# Patient Record
Sex: MALE | Race: WHITE | NOT HISPANIC OR LATINO | Employment: UNEMPLOYED | URBAN - METROPOLITAN AREA
[De-identification: names, ages, dates, MRNs, and addresses within clinical notes are randomized per-mention and may not be internally consistent; named-entity substitution may affect disease eponyms.]

---

## 2019-12-05 ENCOUNTER — OFFICE VISIT (OUTPATIENT)
Dept: OCCUPATIONAL MEDICINE | Age: 29
End: 2019-12-05

## 2019-12-05 DIAGNOSIS — Z02.1 DRUG TESTING, PRE-EMPLOYMENT: Primary | ICD-10-CM

## 2019-12-05 PROCEDURE — OH093 7 PANEL RAPID DRUG SCREEN: Performed by: PREVENTIVE MEDICINE

## 2019-12-06 ENCOUNTER — APPOINTMENT (OUTPATIENT)
Dept: OCCUPATIONAL MEDICINE | Age: 29
End: 2019-12-06

## 2019-12-23 ENCOUNTER — OCC HEALTH (OUTPATIENT)
Dept: OCCUPATIONAL MEDICINE | Age: 29
End: 2019-12-23

## 2019-12-23 VITALS
BODY MASS INDEX: 28.63 KG/M2 | WEIGHT: 216 LBS | DIASTOLIC BLOOD PRESSURE: 78 MMHG | SYSTOLIC BLOOD PRESSURE: 116 MMHG | HEART RATE: 60 BPM | HEIGHT: 73 IN

## 2019-12-23 DIAGNOSIS — Z02.1 PRE-EMPLOYMENT HEALTH SCREENING EXAMINATION: Primary | ICD-10-CM

## 2019-12-23 DIAGNOSIS — Z02.89 ENCOUNTER FOR OCCUPATIONAL HEALTH EXAMINATION: ICD-10-CM

## 2019-12-23 DIAGNOSIS — Z02.89 VISIT FOR OCCUPATIONAL HEALTH EXAMINATION: ICD-10-CM

## 2019-12-23 PROCEDURE — 86480 TB TEST CELL IMMUN MEASURE: CPT | Performed by: INTERNAL MEDICINE

## 2019-12-23 PROCEDURE — 36415 COLL VENOUS BLD VENIPUNCTURE: CPT | Performed by: PHYSICIAN ASSISTANT

## 2019-12-23 PROCEDURE — OH093 7 PANEL RAPID DRUG SCREEN: Performed by: PHYSICIAN ASSISTANT

## 2019-12-23 PROCEDURE — OH021 PRE PLACEMENT PHYSICAL EXAM: Performed by: PHYSICIAN ASSISTANT

## 2019-12-25 LAB
GAMMA INTERFERON BACKGROUND BLD IA-ACNC: 0.03 IU/ML
M TB IFN-G BLD-IMP: NEGATIVE
M TB IFN-G CD4+ BCKGRND COR BLD-ACNC: 0 IU/ML
M TB IFN-G CD4+CD8+ BCKGRND COR BLD-ACNC: 0 IU/ML
MITOGEN IGNF BCKGRD COR BLD-ACNC: >10 IU/ML

## 2020-06-02 ENCOUNTER — APPOINTMENT (OUTPATIENT)
Dept: OCCUPATIONAL MEDICINE | Age: 30
End: 2020-06-02

## 2022-02-28 ENCOUNTER — APPOINTMENT (OUTPATIENT)
Dept: CT IMAGING | Facility: HOSPITAL | Age: 32
End: 2022-02-28

## 2022-02-28 ENCOUNTER — HOSPITAL ENCOUNTER (EMERGENCY)
Facility: HOSPITAL | Age: 32
Discharge: LEFT BEFORE DISCHARGE | End: 2022-02-28
Admitting: EMERGENCY MEDICINE

## 2022-02-28 VITALS
HEART RATE: 118 BPM | TEMPERATURE: 97.7 F | BODY MASS INDEX: 24.87 KG/M2 | DIASTOLIC BLOOD PRESSURE: 87 MMHG | SYSTOLIC BLOOD PRESSURE: 142 MMHG | HEIGHT: 75 IN | RESPIRATION RATE: 18 BRPM | OXYGEN SATURATION: 96 % | WEIGHT: 200 LBS

## 2022-02-28 DIAGNOSIS — R19.7 DIARRHEA, UNSPECIFIED TYPE: Primary | ICD-10-CM

## 2022-02-28 LAB
ALBUMIN SERPL-MCNC: 4.47 G/DL (ref 3.5–5.2)
ALBUMIN/GLOB SERPL: 1.5 G/DL
ALP SERPL-CCNC: 124 U/L (ref 39–117)
ALT SERPL W P-5'-P-CCNC: 34 U/L (ref 1–41)
ANION GAP SERPL CALCULATED.3IONS-SCNC: 12.5 MMOL/L (ref 5–15)
AST SERPL-CCNC: 29 U/L (ref 1–40)
BASOPHILS # BLD AUTO: 0.05 10*3/MM3 (ref 0–0.2)
BASOPHILS NFR BLD AUTO: 0.5 % (ref 0–1.5)
BILIRUB SERPL-MCNC: 0.3 MG/DL (ref 0–1.2)
BILIRUB UR QL STRIP: NEGATIVE
BUN SERPL-MCNC: 12 MG/DL (ref 6–20)
BUN/CREAT SERPL: 14.1 (ref 7–25)
CALCIUM SPEC-SCNC: 9.5 MG/DL (ref 8.6–10.5)
CHLORIDE SERPL-SCNC: 101 MMOL/L (ref 98–107)
CLARITY UR: CLEAR
CO2 SERPL-SCNC: 23.5 MMOL/L (ref 22–29)
COLOR UR: ABNORMAL
CREAT SERPL-MCNC: 0.85 MG/DL (ref 0.76–1.27)
DEPRECATED RDW RBC AUTO: 49.8 FL (ref 37–54)
EGFRCR SERPLBLD CKD-EPI 2021: 119.9 ML/MIN/1.73
EOSINOPHIL # BLD AUTO: 0.14 10*3/MM3 (ref 0–0.4)
EOSINOPHIL NFR BLD AUTO: 1.4 % (ref 0.3–6.2)
ERYTHROCYTE [DISTWIDTH] IN BLOOD BY AUTOMATED COUNT: 15.5 % (ref 12.3–15.4)
GLOBULIN UR ELPH-MCNC: 2.9 GM/DL
GLUCOSE SERPL-MCNC: 91 MG/DL (ref 65–99)
GLUCOSE UR STRIP-MCNC: NEGATIVE MG/DL
HCT VFR BLD AUTO: 46.4 % (ref 37.5–51)
HGB BLD-MCNC: 15.1 G/DL (ref 13–17.7)
HGB UR QL STRIP.AUTO: NEGATIVE
HOLD SPECIMEN: NORMAL
HOLD SPECIMEN: NORMAL
IMM GRANULOCYTES # BLD AUTO: 0.03 10*3/MM3 (ref 0–0.05)
IMM GRANULOCYTES NFR BLD AUTO: 0.3 % (ref 0–0.5)
KETONES UR QL STRIP: NEGATIVE
LEUKOCYTE ESTERASE UR QL STRIP.AUTO: NEGATIVE
LIPASE SERPL-CCNC: 25 U/L (ref 13–60)
LYMPHOCYTES # BLD AUTO: 2.93 10*3/MM3 (ref 0.7–3.1)
LYMPHOCYTES NFR BLD AUTO: 29.4 % (ref 19.6–45.3)
MCH RBC QN AUTO: 28.6 PG (ref 26.6–33)
MCHC RBC AUTO-ENTMCNC: 32.5 G/DL (ref 31.5–35.7)
MCV RBC AUTO: 87.9 FL (ref 79–97)
MONOCYTES # BLD AUTO: 0.66 10*3/MM3 (ref 0.1–0.9)
MONOCYTES NFR BLD AUTO: 6.6 % (ref 5–12)
NEUTROPHILS NFR BLD AUTO: 6.14 10*3/MM3 (ref 1.7–7)
NEUTROPHILS NFR BLD AUTO: 61.8 % (ref 42.7–76)
NITRITE UR QL STRIP: NEGATIVE
NRBC BLD AUTO-RTO: 0 /100 WBC (ref 0–0.2)
PH UR STRIP.AUTO: 6.5 [PH] (ref 5–8)
PLATELET # BLD AUTO: 256 10*3/MM3 (ref 140–450)
PMV BLD AUTO: 11.4 FL (ref 6–12)
POTASSIUM SERPL-SCNC: 4.1 MMOL/L (ref 3.5–5.2)
PROT SERPL-MCNC: 7.4 G/DL (ref 6–8.5)
PROT UR QL STRIP: NEGATIVE
RBC # BLD AUTO: 5.28 10*6/MM3 (ref 4.14–5.8)
SODIUM SERPL-SCNC: 137 MMOL/L (ref 136–145)
SP GR UR STRIP: >=1.03 (ref 1–1.03)
UROBILINOGEN UR QL STRIP: ABNORMAL
WBC NRBC COR # BLD: 9.95 10*3/MM3 (ref 3.4–10.8)
WHOLE BLOOD HOLD SPECIMEN: NORMAL
WHOLE BLOOD HOLD SPECIMEN: NORMAL

## 2022-02-28 PROCEDURE — 83690 ASSAY OF LIPASE: CPT | Performed by: PHYSICIAN ASSISTANT

## 2022-02-28 PROCEDURE — 74176 CT ABD & PELVIS W/O CONTRAST: CPT

## 2022-02-28 PROCEDURE — 36415 COLL VENOUS BLD VENIPUNCTURE: CPT

## 2022-02-28 PROCEDURE — 81003 URINALYSIS AUTO W/O SCOPE: CPT | Performed by: PHYSICIAN ASSISTANT

## 2022-02-28 PROCEDURE — 99282 EMERGENCY DEPT VISIT SF MDM: CPT

## 2022-02-28 PROCEDURE — 80053 COMPREHEN METABOLIC PANEL: CPT | Performed by: PHYSICIAN ASSISTANT

## 2022-02-28 PROCEDURE — 85025 COMPLETE CBC W/AUTO DIFF WBC: CPT | Performed by: PHYSICIAN ASSISTANT

## 2022-03-01 NOTE — ED NOTES
Pt approached triage desk stating he wishes to leave and doesn't want to wait for further testing. Educated pt on risk and benefits of leaving AMA, verbalized understanding, continues to wish to leave AMA.     Alma London, RN  02/28/22 [id number]

## 2022-03-01 NOTE — DISCHARGE INSTRUCTIONS
Call one of the offices below to establish a primary care provider.  If you are unable to get an appointment and feel it is an emergency and need to be seen immediately please return to the Emergency Department.    Call one of the office below to set up a primary care provider.    Dr. Jeramie Avalos                                                                                                       [address] [facility] [address]  [phone]    Dr. Paredes, Dr. DEBBI Yoon, Dr. RAQUEL Yoon ([facility])  [address] [facility] [address]  [phone]    Dr. Haywood, Dr. Emery, Dr. Andersen ([facility])  [address] [facility] [address]  [phone]    Dr. Zacarias  [address] [facility] [address]  [phone]    Dr. Torres, Dr. Hung, Dr. Bee, Dr. Dan ([facility])  [address] [place] [address] [address]  [facility] [address]  [phone]    Dr. Carol Mak  [address] [facility] [place] [address]  [phone]    Dr. Alayna Francisco  [address]   [address]  [facility] [address]  [phone]    Dr. Avalos  [address] [facility] [address]  [phone]    Dr. Gomez, Dr. Dave  [address] [facility] [place] [address]  [phone]    Dr. Salazar  [address]                                                              [address]  [facility] [address]  [phone]    Dr. Gupta  [address] [facility] [address]  [phone]    Dr. Jocelynn Reilly  [address]  [address]  [place] [address]  [phone]    Dr. Flowers and [facility]   [address] [facility]  [address]  [place] [address]  [phone]

## 2023-11-14 ENCOUNTER — HOSPITAL ENCOUNTER (EMERGENCY)
Facility: HOSPITAL | Age: 33
Discharge: PSYCHIATRIC OR SUBSTANCE USE FACILITY | DRG: 897 | End: 2023-11-15
Attending: EMERGENCY MEDICINE | Admitting: EMERGENCY MEDICINE
Payer: MEDICAID

## 2023-11-14 DIAGNOSIS — R45.851 SUICIDAL IDEATIONS: Primary | ICD-10-CM

## 2023-11-14 DIAGNOSIS — F28 OTHER PSYCHOTIC DISORDER NOT DUE TO SUBSTANCE OR KNOWN PHYSIOLOGICAL CONDITION: ICD-10-CM

## 2023-11-14 LAB
ALBUMIN SERPL-MCNC: 4.9 G/DL (ref 3.5–5.2)
ALBUMIN/GLOB SERPL: 1.4 G/DL
ALP SERPL-CCNC: 121 U/L (ref 39–117)
ALT SERPL W P-5'-P-CCNC: 26 U/L (ref 1–41)
AMPHET+METHAMPHET UR QL: POSITIVE
AMPHETAMINES UR QL: POSITIVE
ANION GAP SERPL CALCULATED.3IONS-SCNC: 14.6 MMOL/L (ref 5–15)
AST SERPL-CCNC: 17 U/L (ref 1–40)
BACTERIA UR QL AUTO: ABNORMAL /HPF
BARBITURATES UR QL SCN: NEGATIVE
BASOPHILS # BLD AUTO: 0.05 10*3/MM3 (ref 0–0.2)
BASOPHILS NFR BLD AUTO: 0.4 % (ref 0–1.5)
BENZODIAZ UR QL SCN: NEGATIVE
BILIRUB SERPL-MCNC: 0.7 MG/DL (ref 0–1.2)
BILIRUB UR QL STRIP: ABNORMAL
BUN SERPL-MCNC: 21 MG/DL (ref 6–20)
BUN/CREAT SERPL: 16.3 (ref 7–25)
BUPRENORPHINE SERPL-MCNC: NEGATIVE NG/ML
CALCIUM SPEC-SCNC: 10.6 MG/DL (ref 8.6–10.5)
CANNABINOIDS SERPL QL: POSITIVE
CHLORIDE SERPL-SCNC: 105 MMOL/L (ref 98–107)
CLARITY UR: ABNORMAL
CO2 SERPL-SCNC: 19.4 MMOL/L (ref 22–29)
COCAINE UR QL: NEGATIVE
COLOR UR: ABNORMAL
CREAT SERPL-MCNC: 1.29 MG/DL (ref 0.76–1.27)
DEPRECATED RDW RBC AUTO: 44.4 FL (ref 37–54)
EGFRCR SERPLBLD CKD-EPI 2021: 75.6 ML/MIN/1.73
EOSINOPHIL # BLD AUTO: 0.01 10*3/MM3 (ref 0–0.4)
EOSINOPHIL NFR BLD AUTO: 0.1 % (ref 0.3–6.2)
ERYTHROCYTE [DISTWIDTH] IN BLOOD BY AUTOMATED COUNT: 13.8 % (ref 12.3–15.4)
ETHANOL BLD-MCNC: <10 MG/DL (ref 0–10)
ETHANOL UR QL: <0.01 %
FENTANYL UR-MCNC: NEGATIVE NG/ML
GLOBULIN UR ELPH-MCNC: 3.5 GM/DL
GLUCOSE SERPL-MCNC: 134 MG/DL (ref 65–99)
GLUCOSE UR STRIP-MCNC: NEGATIVE MG/DL
HCT VFR BLD AUTO: 49.3 % (ref 37.5–51)
HGB BLD-MCNC: 16.5 G/DL (ref 13–17.7)
HGB UR QL STRIP.AUTO: NEGATIVE
HYALINE CASTS UR QL AUTO: ABNORMAL /LPF
IMM GRANULOCYTES # BLD AUTO: 0.04 10*3/MM3 (ref 0–0.05)
IMM GRANULOCYTES NFR BLD AUTO: 0.3 % (ref 0–0.5)
KETONES UR QL STRIP: ABNORMAL
LEUKOCYTE ESTERASE UR QL STRIP.AUTO: NEGATIVE
LYMPHOCYTES # BLD AUTO: 2.07 10*3/MM3 (ref 0.7–3.1)
LYMPHOCYTES NFR BLD AUTO: 16.4 % (ref 19.6–45.3)
MAGNESIUM SERPL-MCNC: 1.9 MG/DL (ref 1.6–2.6)
MCH RBC QN AUTO: 29.2 PG (ref 26.6–33)
MCHC RBC AUTO-ENTMCNC: 33.5 G/DL (ref 31.5–35.7)
MCV RBC AUTO: 87.1 FL (ref 79–97)
METHADONE UR QL SCN: NEGATIVE
MONOCYTES # BLD AUTO: 0.87 10*3/MM3 (ref 0.1–0.9)
MONOCYTES NFR BLD AUTO: 6.9 % (ref 5–12)
NEUTROPHILS NFR BLD AUTO: 75.9 % (ref 42.7–76)
NEUTROPHILS NFR BLD AUTO: 9.59 10*3/MM3 (ref 1.7–7)
NITRITE UR QL STRIP: NEGATIVE
NRBC BLD AUTO-RTO: 0 /100 WBC (ref 0–0.2)
OPIATES UR QL: NEGATIVE
OXYCODONE UR QL SCN: NEGATIVE
PCP UR QL SCN: NEGATIVE
PH UR STRIP.AUTO: 6.5 [PH] (ref 5–8)
PLATELET # BLD AUTO: 323 10*3/MM3 (ref 140–450)
PMV BLD AUTO: 11.2 FL (ref 6–12)
POTASSIUM SERPL-SCNC: 3.4 MMOL/L (ref 3.5–5.2)
PROT SERPL-MCNC: 8.4 G/DL (ref 6–8.5)
PROT UR QL STRIP: ABNORMAL
RBC # BLD AUTO: 5.66 10*6/MM3 (ref 4.14–5.8)
RBC # UR STRIP: ABNORMAL /HPF
REF LAB TEST METHOD: ABNORMAL
SODIUM SERPL-SCNC: 139 MMOL/L (ref 136–145)
SP GR UR STRIP: >1.03 (ref 1–1.03)
SQUAMOUS #/AREA URNS HPF: ABNORMAL /HPF
TRICYCLICS UR QL SCN: NEGATIVE
UROBILINOGEN UR QL STRIP: ABNORMAL
WBC # UR STRIP: ABNORMAL /HPF
WBC NRBC COR # BLD: 12.63 10*3/MM3 (ref 3.4–10.8)

## 2023-11-14 PROCEDURE — 80053 COMPREHEN METABOLIC PANEL: CPT | Performed by: EMERGENCY MEDICINE

## 2023-11-14 PROCEDURE — 99285 EMERGENCY DEPT VISIT HI MDM: CPT

## 2023-11-14 PROCEDURE — 81001 URINALYSIS AUTO W/SCOPE: CPT | Performed by: EMERGENCY MEDICINE

## 2023-11-14 PROCEDURE — 25010000002 HALOPERIDOL LACTATE PER 5 MG: Performed by: PHYSICIAN ASSISTANT

## 2023-11-14 PROCEDURE — 85025 COMPLETE CBC W/AUTO DIFF WBC: CPT | Performed by: EMERGENCY MEDICINE

## 2023-11-14 PROCEDURE — 96372 THER/PROPH/DIAG INJ SC/IM: CPT

## 2023-11-14 PROCEDURE — 83735 ASSAY OF MAGNESIUM: CPT | Performed by: EMERGENCY MEDICINE

## 2023-11-14 PROCEDURE — 80307 DRUG TEST PRSMV CHEM ANLYZR: CPT | Performed by: EMERGENCY MEDICINE

## 2023-11-14 PROCEDURE — 82077 ASSAY SPEC XCP UR&BREATH IA: CPT | Performed by: EMERGENCY MEDICINE

## 2023-11-14 RX ORDER — HALOPERIDOL 5 MG/ML
10 INJECTION INTRAMUSCULAR ONCE
Status: COMPLETED | OUTPATIENT
Start: 2023-11-15 | End: 2023-11-14

## 2023-11-14 RX ORDER — LORAZEPAM 1 MG/1
1 TABLET ORAL ONCE
Status: COMPLETED | OUTPATIENT
Start: 2023-11-14 | End: 2023-11-14

## 2023-11-14 RX ADMIN — LORAZEPAM 1 MG: 1 TABLET ORAL at 22:52

## 2023-11-14 RX ADMIN — HALOPERIDOL LACTATE 10 MG: 5 INJECTION, SOLUTION INTRAMUSCULAR at 23:47

## 2023-11-15 ENCOUNTER — HOSPITAL ENCOUNTER (INPATIENT)
Facility: HOSPITAL | Age: 33
LOS: 7 days | Discharge: HOME | DRG: 897 | End: 2023-11-22
Attending: PSYCHIATRY & NEUROLOGY | Admitting: PSYCHIATRY & NEUROLOGY
Payer: MEDICAID

## 2023-11-15 VITALS
WEIGHT: 211 LBS | HEIGHT: 75 IN | SYSTOLIC BLOOD PRESSURE: 97 MMHG | DIASTOLIC BLOOD PRESSURE: 64 MMHG | HEART RATE: 85 BPM | BODY MASS INDEX: 26.24 KG/M2 | TEMPERATURE: 98 F | OXYGEN SATURATION: 97 % | RESPIRATION RATE: 16 BRPM

## 2023-11-15 PROBLEM — R45.851 SUICIDAL IDEATION: Status: ACTIVE | Noted: 2023-11-15

## 2023-11-15 PROBLEM — F29 PSYCHOSIS: Status: ACTIVE | Noted: 2023-11-15

## 2023-11-15 PROBLEM — F19.90 POLYSUBSTANCE USE DISORDER: Status: ACTIVE | Noted: 2023-11-15

## 2023-11-15 LAB
HAV IGM SERPL QL IA: ABNORMAL
HBV CORE IGM SERPL QL IA: ABNORMAL
HBV SURFACE AG SERPL QL IA: ABNORMAL
HCV AB SER DONR QL: REACTIVE

## 2023-11-15 PROCEDURE — 99223 1ST HOSP IP/OBS HIGH 75: CPT | Performed by: PSYCHIATRY & NEUROLOGY

## 2023-11-15 PROCEDURE — 80074 ACUTE HEPATITIS PANEL: CPT | Performed by: PSYCHIATRY & NEUROLOGY

## 2023-11-15 PROCEDURE — 93005 ELECTROCARDIOGRAM TRACING: CPT | Performed by: PSYCHIATRY & NEUROLOGY

## 2023-11-15 RX ORDER — AMOXICILLIN AND CLAVULANATE POTASSIUM 875; 125 MG/1; MG/1
1 TABLET, FILM COATED ORAL 2 TIMES DAILY
Status: COMPLETED | OUTPATIENT
Start: 2023-11-15 | End: 2023-11-18

## 2023-11-15 RX ORDER — OLANZAPINE 5 MG/1
5 TABLET ORAL NIGHTLY
Status: DISCONTINUED | OUTPATIENT
Start: 2023-11-15 | End: 2023-11-21

## 2023-11-15 RX ORDER — ECHINACEA PURPUREA EXTRACT 125 MG
2 TABLET ORAL AS NEEDED
Status: DISCONTINUED | OUTPATIENT
Start: 2023-11-15 | End: 2023-11-22 | Stop reason: HOSPADM

## 2023-11-15 RX ORDER — FAMOTIDINE 20 MG/1
20 TABLET, FILM COATED ORAL 2 TIMES DAILY PRN
Status: DISCONTINUED | OUTPATIENT
Start: 2023-11-15 | End: 2023-11-22 | Stop reason: HOSPADM

## 2023-11-15 RX ORDER — BENZONATATE 100 MG/1
100 CAPSULE ORAL 3 TIMES DAILY PRN
Status: DISCONTINUED | OUTPATIENT
Start: 2023-11-15 | End: 2023-11-22 | Stop reason: HOSPADM

## 2023-11-15 RX ORDER — ALBUTEROL SULFATE 90 UG/1
2 AEROSOL, METERED RESPIRATORY (INHALATION) EVERY 4 HOURS PRN
COMMUNITY

## 2023-11-15 RX ORDER — ACETAMINOPHEN 325 MG/1
650 TABLET ORAL EVERY 6 HOURS PRN
Status: DISCONTINUED | OUTPATIENT
Start: 2023-11-15 | End: 2023-11-22 | Stop reason: HOSPADM

## 2023-11-15 RX ORDER — ROPINIROLE 1 MG/1
2 TABLET, FILM COATED ORAL NIGHTLY
Status: DISCONTINUED | OUTPATIENT
Start: 2023-11-15 | End: 2023-11-22 | Stop reason: HOSPADM

## 2023-11-15 RX ORDER — NICOTINE 21 MG/24HR
1 PATCH, TRANSDERMAL 24 HOURS TRANSDERMAL
Status: DISCONTINUED | OUTPATIENT
Start: 2023-11-15 | End: 2023-11-22 | Stop reason: HOSPADM

## 2023-11-15 RX ORDER — AMOXICILLIN 875 MG/1
875 TABLET, COATED ORAL 2 TIMES DAILY
COMMUNITY
Start: 2023-11-09 | End: 2023-11-22 | Stop reason: HOSPADM

## 2023-11-15 RX ORDER — ALBUTEROL SULFATE 90 UG/1
2 AEROSOL, METERED RESPIRATORY (INHALATION) EVERY 4 HOURS PRN
Status: DISCONTINUED | OUTPATIENT
Start: 2023-11-15 | End: 2023-11-22 | Stop reason: HOSPADM

## 2023-11-15 RX ORDER — ALUMINA, MAGNESIA, AND SIMETHICONE 2400; 2400; 240 MG/30ML; MG/30ML; MG/30ML
15 SUSPENSION ORAL EVERY 6 HOURS PRN
Status: DISCONTINUED | OUTPATIENT
Start: 2023-11-15 | End: 2023-11-22 | Stop reason: HOSPADM

## 2023-11-15 RX ORDER — DICYCLOMINE HYDROCHLORIDE 10 MG/1
10 CAPSULE ORAL DAILY PRN
COMMUNITY

## 2023-11-15 RX ORDER — TRAZODONE HYDROCHLORIDE 50 MG/1
50 TABLET ORAL NIGHTLY PRN
Status: DISCONTINUED | OUTPATIENT
Start: 2023-11-15 | End: 2023-11-22 | Stop reason: HOSPADM

## 2023-11-15 RX ORDER — LOPERAMIDE HYDROCHLORIDE 2 MG/1
2 CAPSULE ORAL
Status: DISCONTINUED | OUTPATIENT
Start: 2023-11-15 | End: 2023-11-22 | Stop reason: HOSPADM

## 2023-11-15 RX ORDER — IBUPROFEN 800 MG/1
800 TABLET ORAL 2 TIMES DAILY PRN
COMMUNITY

## 2023-11-15 RX ORDER — LISINOPRIL 10 MG/1
10 TABLET ORAL DAILY
Status: DISCONTINUED | OUTPATIENT
Start: 2023-11-15 | End: 2023-11-22 | Stop reason: HOSPADM

## 2023-11-15 RX ORDER — IBUPROFEN 400 MG/1
400 TABLET ORAL EVERY 6 HOURS PRN
Status: DISCONTINUED | OUTPATIENT
Start: 2023-11-15 | End: 2023-11-22 | Stop reason: HOSPADM

## 2023-11-15 RX ORDER — BENZTROPINE MESYLATE 1 MG/1
2 TABLET ORAL ONCE AS NEEDED
Status: DISCONTINUED | OUTPATIENT
Start: 2023-11-15 | End: 2023-11-22 | Stop reason: HOSPADM

## 2023-11-15 RX ORDER — ONDANSETRON 4 MG/1
4 TABLET, FILM COATED ORAL EVERY 6 HOURS PRN
Status: DISCONTINUED | OUTPATIENT
Start: 2023-11-15 | End: 2023-11-22 | Stop reason: HOSPADM

## 2023-11-15 RX ORDER — BENZTROPINE MESYLATE 1 MG/ML
1 INJECTION INTRAMUSCULAR; INTRAVENOUS ONCE AS NEEDED
Status: DISCONTINUED | OUTPATIENT
Start: 2023-11-15 | End: 2023-11-22 | Stop reason: HOSPADM

## 2023-11-15 RX ORDER — ROPINIROLE 2 MG/1
2 TABLET, FILM COATED ORAL
COMMUNITY

## 2023-11-15 RX ORDER — GABAPENTIN 600 MG/1
600 TABLET ORAL
COMMUNITY

## 2023-11-15 RX ORDER — CETIRIZINE HYDROCHLORIDE 10 MG/1
10 TABLET ORAL DAILY
COMMUNITY

## 2023-11-15 RX ORDER — GABAPENTIN 300 MG/1
600 CAPSULE ORAL DAILY
Status: DISCONTINUED | OUTPATIENT
Start: 2023-11-15 | End: 2023-11-22 | Stop reason: HOSPADM

## 2023-11-15 RX ORDER — HYDROXYZINE 50 MG/1
50 TABLET, FILM COATED ORAL EVERY 6 HOURS PRN
Status: DISCONTINUED | OUTPATIENT
Start: 2023-11-15 | End: 2023-11-22 | Stop reason: HOSPADM

## 2023-11-15 RX ORDER — LISINOPRIL 10 MG/1
10 TABLET ORAL DAILY
COMMUNITY

## 2023-11-15 RX ORDER — IBUPROFEN 400 MG/1
800 TABLET ORAL 2 TIMES DAILY PRN
Status: CANCELLED | OUTPATIENT
Start: 2023-11-15

## 2023-11-15 RX ORDER — CETIRIZINE HYDROCHLORIDE 10 MG/1
10 TABLET ORAL DAILY
Status: DISCONTINUED | OUTPATIENT
Start: 2023-11-15 | End: 2023-11-22 | Stop reason: HOSPADM

## 2023-11-15 RX ORDER — DICYCLOMINE HYDROCHLORIDE 10 MG/1
10 CAPSULE ORAL DAILY PRN
Status: DISCONTINUED | OUTPATIENT
Start: 2023-11-15 | End: 2023-11-22 | Stop reason: HOSPADM

## 2023-11-15 RX ORDER — GABAPENTIN 300 MG/1
600 CAPSULE ORAL DAILY
Status: CANCELLED | OUTPATIENT
Start: 2023-11-15

## 2023-11-15 RX ADMIN — OLANZAPINE 5 MG: 5 TABLET, FILM COATED ORAL at 21:13

## 2023-11-15 RX ADMIN — AMOXICILLIN AND CLAVULANATE POTASSIUM 1 TABLET: 875; 125 TABLET, FILM COATED ORAL at 21:04

## 2023-11-15 RX ADMIN — AMOXICILLIN AND CLAVULANATE POTASSIUM 1 TABLET: 875; 125 TABLET, FILM COATED ORAL at 08:47

## 2023-11-15 RX ADMIN — GABAPENTIN 600 MG: 300 CAPSULE ORAL at 08:48

## 2023-11-15 RX ADMIN — OLANZAPINE 5 MG: 5 TABLET, FILM COATED ORAL at 12:05

## 2023-11-15 RX ADMIN — ROPINIROLE HYDROCHLORIDE 2 MG: 1 TABLET, FILM COATED ORAL at 21:04

## 2023-11-15 RX ADMIN — CETIRIZINE HYDROCHLORIDE 10 MG: 10 TABLET, FILM COATED ORAL at 08:48

## 2023-11-15 RX ADMIN — HYDROXYZINE HYDROCHLORIDE 50 MG: 50 TABLET ORAL at 21:04

## 2023-11-15 RX ADMIN — LISINOPRIL 10 MG: 10 TABLET ORAL at 08:48

## 2023-11-15 NOTE — NURSING NOTE
Pt assessment complete     Pt comes in by ems with worsening suicidal ideations with a plan to overdose, freeze, or choke self.   Pt denies hi and reports auditory hallucinations hearing whispers   Depression 9  Anxiety 10   Poor sleep and appetite   Pt reports smoking 5 grams weekly marijuana and that he last smoked 11/14/23   Pt denies meth use Uds is positive   Pt is paranoid. He is afraid people are after him.

## 2023-11-15 NOTE — H&P
INITIAL PSYCHIATRIC HISTORY & PHYSICAL    Patient Identification:  Name:  Natanael Cavazos  Age:  33 y.o.  Sex:  male  :  1990  MRN:  [id number]   Visit Number:  [id number]  Primary Care Physician:  Leti Coronel APRN    SUBJECTIVE    CC/Focus of Exam: Psychosis, SI    HPI: Natanael Cavazos is a 33 y.o. male who was admitted on 11/15/2023 with complaints of not feeling good. The patient was brought in by the EMS with worsening suicidal ideatoins and a plan to overdose, freeze or choke self. Patient also reported hearing voice for a couple of months now. He states sometimes it is just whispers and he cannot understand and other times it is someone calling his name. The patient was acting paranoid and talked about having a black out last week and he is not sure what caused it and how long it lasted and then he became worried about his family, and he asked his mother, sister and aunt about his other sister but no one would give him a straight answer and he believes she  of bleeding and crush injuries but then he stated that no one has told him that she is dead and he feels they are hiding information from him. He states he willingly came to the hospital because he wants to find out if it is something he caused to happen when he had a black out or something else. He is a bit restless and irritable and not able to provide a coherent account of what happened.     PAST PSYCHIATRIC HX: The patient reports he has been treated for anxiety in the past.     SUBSTANCE USE HX: The patient admits to using alcohol occassionally, marijuana every once in a while and has used meth once in past six years. In the ED he admitted to smoking marijuana heavily.     SOCIAL HX:   Social History     Socioeconomic History    Marital status: Single     Spouse name: denies    Number of children: 0    Years of education: some college    Highest education level: Some college, no degree   Tobacco Use    Smoking status: Every  Day     Packs/day: 1.00     Years: 23.00     Additional pack years: 0.00     Total pack years: 23.00     Types: Cigarettes     Passive exposure: Current    Smokeless tobacco: Never    Tobacco comments:     denies   Vaping Use    Vaping Use: Some days    Substances: Nicotine, Flavoring    Devices: Disposable, Pre-filled or refillable cartridge, Pre-filled pod    Passive vaping exposure: Yes   Substance and Sexual Activity    Alcohol use: Yes     Comment: occasional use    Drug use: Yes     Types: Marijuana, Methamphetamines, Amphetamines    Sexual activity: Not Currently         Past Medical History:   Diagnosis Date    Hypertension           Past Surgical History:   Procedure Laterality Date    ARM WOUND REPAIR / CLOSURE Right     CYST REMOVAL      tail bone       Family History   Problem Relation Age of Onset    Bipolar disorder Mother     Heart disease Mother     Bipolar disorder Father          Medications Prior to Admission   Medication Sig Dispense Refill Last Dose    albuterol sulfate  (90 Base) MCG/ACT inhaler Inhale 2 puffs Every 4 (Four) Hours As Needed for Wheezing.       amoxicillin (AMOXIL) 875 MG tablet Take 1 tablet by mouth 2 (Two) Times a Day.   11/14/2023 at 1400    cetirizine (zyrTEC) 10 MG tablet Take 1 tablet by mouth Daily.   Past Week    dicyclomine (BENTYL) 10 MG capsule Take 1 capsule by mouth Daily As Needed for Abdominal Cramping.   Past Week    gabapentin (NEURONTIN) 600 MG tablet Take 1 tablet by mouth every night at bedtime.   Past Week    ibuprofen (ADVIL,MOTRIN) 800 MG tablet Take 1 tablet by mouth 2 (Two) Times a Day As Needed for Mild Pain.   Past Week    lisinopril (PRINIVIL,ZESTRIL) 10 MG tablet Take 1 tablet by mouth Daily.   Past Week    rOPINIRole (REQUIP) 2 MG tablet Take 1 tablet by mouth every night at bedtime.   Past Week         ALLERGIES:  Bee venom and Strawberry    Temp:  [96.9 °F (36.1 °C)-98.6 °F (37 °C)] 97.1 °F (36.2 °C)  Heart Rate:  [] 92  Resp:   [16-18] 17  BP: ()/(60-90) 112/60    REVIEW OF SYSTEMS:  Review of Systems   Constitutional: Negative.    HENT: Negative.     Eyes: Negative.    Respiratory: Negative.     Cardiovascular: Negative.    Gastrointestinal: Negative.    Endocrine: Negative.    Genitourinary: Negative.    Musculoskeletal: Negative.    Skin: Negative.    Allergic/Immunologic: Negative.    Neurological: Negative.    Hematological: Negative.    Psychiatric/Behavioral:  Positive for dysphoric mood, hallucinations and suicidal ideas. The patient is nervous/anxious.         OBJECTIVE    PHYSICAL EXAM:  Physical Exam  Constitutional:  Appears well-developed and well-nourished.   HENT:   Head: Normocephalic and atraumatic.   Right Ear: External ear normal.   Left Ear: External ear normal.   Mouth/Throat: Oropharynx is clear and moist.   Eyes: Pupils are equal, round, and reactive to light. Conjunctivae and EOM are normal.   Neck: Normal range of motion. Neck supple.   Cardiovascular: Normal rate, regular rhythm and normal heart sounds.    Respiratory: Effort normal and breath sounds normal. No respiratory distress. No wheezes.   GI: Soft. Bowel sounds are normal.No distension. There is no tenderness.   Musculoskeletal: Normal range of motion. No edema or deformity.   Neurological:No cranial nerve deficit. Coordination normal.   Skin: Skin is warm and dry. No rash noted. No erythema.     MENTAL STATUS EXAM:   Hygiene:   poor  Cooperation:  Cooperative  Eye Contact:  Fair  Psychomotor Behavior:  Slow  Affect:  Restricted  Hopelessness: 5  Speech:  Minimal  Thought Progress: Disorganized  Thought Content:  Bizarre  Suicidal:  Suicidal Ideation  Homicidal:  None  Hallucinations:  Auditory  Delusion:  Paranoid  Memory:  Deficits  Orientation:  Person and Place  Reliability:  poor  Insight:  Poor  Judgement:  Poor  Impulse Control:  Poor    Imaging Results (Last 24 Hours)       ** No results found for the last 24 hours. **             ECG/EMG  Results (most recent)       Procedure Component Value Units Date/Time    ECG 12 Lead Other; Baseline Cardiac Status [[id number]] Collected: 11/15/23 0447     Updated: 11/15/23 0448     QT Interval 386 ms      QTC Interval 442 ms     Narrative:      Test Reason : Other~  Blood Pressure :   */*   mmHG  Vent. Rate :  79 BPM     Atrial Rate :  79 BPM     P-R Int : 138 ms          QRS Dur : 100 ms      QT Int : 386 ms       P-R-T Axes :  51  67  35 degrees     QTc Int : 442 ms    Normal sinus rhythm with sinus arrhythmia  Minimal voltage criteria for LVH, may be normal variant  Borderline ECG  No previous ECGs available    Referred By:            Confirmed By:              Lab Results   Component Value Date    GLUCOSE 134 (H) 11/14/2023    BUN 21 (H) 11/14/2023    CREATININE 1.29 (H) 11/14/2023    BCR 16.3 11/14/2023    CO2 19.4 (L) 11/14/2023    CALCIUM 10.6 (H) 11/14/2023    ALBUMIN 4.9 11/14/2023    AST 17 11/14/2023    ALT 26 11/14/2023       Lab Results   Component Value Date    WBC 12.63 (H) 11/14/2023    HGB 16.5 11/14/2023    HCT 49.3 11/14/2023    MCV 87.1 11/14/2023     11/14/2023       Last Urine Toxicity          Latest Ref Rng & Units 11/14/2023   LAST URINE TOXICITY RESULTS   Amphetamine, Urine Qual Negative Positive    Barbiturates Screen, Urine Negative Negative    Benzodiazepine Screen, Urine Negative Negative    Buprenorphine, Screen, Urine Negative Negative    Cocaine Screen, Urine Negative Negative    Fentanyl, Urine Negative Negative    Methadone Screen , Urine Negative Negative    Methamphetamine, Ur Negative Positive        Brief Urine Lab Results  (Last result in the past 365 days)        Color   Clarity   Blood   Leuk Est   Nitrite   Protein   CREAT   Urine HCG        11/14/23 2155 Dark Yellow   Cloudy   Negative   Negative   Negative   30 mg/dL (1+)                   DATA  Labs reviewed. Potassium 3.4, BUN 21, Creatinine 1.29, Glucose 134, Alk phos 121. WBC 12.63. Hep C positive. UA shows  dark yellow color, cloudy appearance, ketones, protein, bilirubin, trace bacteria.   EKG reviewed. QTc interval 442 ms  MARY ANN reviewed.   Record reviewed. No previous treatment noted in this hospital for mental health or substance use problems.       Strengths: Motivated for treatment    Weaknesses:Poor insight, Substance use, and Poor coping skills    Code status:  Full  Discussed code status with patient.    ASSESSMENT & PLAN:    Hospital bed: No      Suicidal ideation  -SP 3      Psychosis unspecified  -Appears to be due to substance use (methamphetamine).   -Will treat symptomatically  -Start olanzapine 5 mg night, first dose now.      Polysubstance use disorder  -Patient admits to using alcohol, thc and meth but is vague about extent of his use. Will monitor for alcohol withdrawals.       Restless legs syndrome  -Continue ropinirole and gabapentin      Elevated creatinine, low potassium  -Repeat BMP in am      The patient has been admitted for safety and stabilization.  Patient will be monitored for suicidality daily and maintained on Special Precautions Level 3 (q15 min checks) .  The patient will have individual and group therapy with a master's level therapist. A master treatment plan will be developed and agreed upon by the patient and his/her treatment team.  The patient's estimated length of stay in the hospital is 5-7 days.

## 2023-11-15 NOTE — PLAN OF CARE
Problem: Adult Behavioral Health Plan of Care  Goal: Plan of Care Review  Outcome: Ongoing, Progressing  Flowsheets  Taken 11/15/2023 1417  Progress: improving  Outcome Evaluation: PATIENT DENIES ANY PROBLEMS THIS SHIFT. PATIENT APPEARS TO BE THOUGHT BLOCKING. AOX3 WITH NO S/S OF ACUTE DISTRESS NOTED. NEW ORDERS: BMP, ZYPREXA 5MG  Taken 11/15/2023 1001  Plan of Care Reviewed With: patient  Patient Agreement with Plan of Care: agrees   Goal Outcome Evaluation:  Plan of Care Reviewed With: patient  Patient Agreement with Plan of Care: agrees     Progress: improving  Outcome Evaluation: PATIENT DENIES ANY PROBLEMS THIS SHIFT. PATIENT APPEARS TO BE THOUGHT BLOCKING. AOX3 WITH NO S/S OF ACUTE DISTRESS NOTED. NEW ORDERS: BMP, ZYPREXA 5MG

## 2023-11-15 NOTE — NURSING NOTE
Presented pt to DR. Sepulveda new order to admit sp3 routine orders when patients pulse is below 100. RBOTX2

## 2023-11-15 NOTE — ED PROVIDER NOTES
Subjective     History provided by:  Patient  Mental Health Problem  Presenting symptoms: suicidal thoughts    Degree of incapacity (severity):  Severe  Onset quality:  Sudden  Duration:  1 day  Timing:  Constant  Progression:  Worsening  Chronicity:  Recurrent  Context: drug abuse    Treatment compliance:  Untreated  Associated symptoms: feelings of worthlessness and poor judgment    Associated symptoms: no abdominal pain and no chest pain    Risk factors: hx of mental illness        Review of Systems   Constitutional: Negative.  Negative for fever.   HENT: Negative.     Respiratory: Negative.     Cardiovascular: Negative.  Negative for chest pain.   Gastrointestinal: Negative.  Negative for abdominal pain.   Endocrine: Negative.    Genitourinary: Negative.  Negative for dysuria.   Skin: Negative.    Neurological: Negative.    Psychiatric/Behavioral:  Positive for suicidal ideas.    All other systems reviewed and are negative.      Past Medical History:   Diagnosis Date    Hypertension        Allergies   Allergen Reactions    Bee Venom Anaphylaxis    Strawberry Anaphylaxis       Past Surgical History:   Procedure Laterality Date    ARM WOUND REPAIR / CLOSURE Right     CYST REMOVAL      tail bone       Family History   Problem Relation Age of Onset    Bipolar disorder Mother     Heart disease Mother     Bipolar disorder Father        Social History     Socioeconomic History    Marital status: Single     Spouse name: denies    Number of children: 0    Years of education: some college    Highest education level: Some college, no degree   Tobacco Use    Smoking status: Every Day     Packs/day: 1.00     Years: 23.00     Additional pack years: 0.00     Total pack years: 23.00     Types: Cigarettes     Passive exposure: Current    Smokeless tobacco: Never    Tobacco comments:     denies   Vaping Use    Vaping Use: Some days    Substances: Nicotine, Flavoring    Devices: Disposable, Pre-filled or refillable cartridge,  Pre-filled pod    Passive vaping exposure: Yes   Substance and Sexual Activity    Alcohol use: Yes     Comment: occasional use    Drug use: Yes     Types: Marijuana, Methamphetamines, Amphetamines    Sexual activity: Not Currently           Objective   Physical Exam  Vitals and nursing note reviewed.   Constitutional:       General: He is not in acute distress.     Appearance: He is well-developed. He is not diaphoretic.   HENT:      Head: Normocephalic and atraumatic.      Right Ear: External ear normal.      Left Ear: External ear normal.      Nose: Nose normal.   Eyes:      Conjunctiva/sclera: Conjunctivae normal.   Neck:      Vascular: No JVD.      Trachea: No tracheal deviation.   Cardiovascular:      Rate and Rhythm: Normal rate.      Heart sounds: No murmur heard.  Pulmonary:      Effort: Pulmonary effort is normal. No respiratory distress.      Breath sounds: No wheezing.   Abdominal:      Palpations: Abdomen is soft.      Tenderness: There is no abdominal tenderness.   Musculoskeletal:         General: No deformity. Normal range of motion.      Cervical back: Normal range of motion and neck supple.   Skin:     General: Skin is warm and dry.      Coloration: Skin is not pale.      Findings: No erythema or rash.   Neurological:      Mental Status: He is alert and oriented to person, place, and time.      Cranial Nerves: No cranial nerve deficit.   Psychiatric:      Comments: Patient verbalizes suicidal ideations.         Procedures           ED Course  ED Course as of 11/15/23 0048   Wed Nov 15, 2023   0047 Patient will be admitted to inpatient behavioral health. [RB]      ED Course User Index  [RB] Otis Carreon II, PA                                           Medical Decision Making  33-year-old male with chief complaint of suicidal ideations.    Problems Addressed:  Suicidal ideations: acute illness or injury     Details: Patient underwent evaluation in intake behavioral health department.  Patient was  deemed to benefit from inpatient admission.  Patient will be admitted to inpatient behavioral services.    Amount and/or Complexity of Data Reviewed  Labs: ordered.    Risk  Prescription drug management.  Decision regarding hospitalization.        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       Medication List      No changes were made to your prescriptions during this visit.            Otis Carreon II, PA  11/15/23 0048

## 2023-11-15 NOTE — NURSING NOTE
Awaiting heart rate to be less than 100 for admit to unit. Repeat heart rate 138. Notified ED Provider, JOIE Carreon. Provider came to unit and reassessed. Recommends to encourage fluids. Pt provided with cups of gatorade.

## 2023-11-15 NOTE — PLAN OF CARE
Problem: Adult Behavioral Health Plan of Care  Goal: Plan of Care Review  Outcome: Ongoing, Not Progressing  Flowsheets (Taken 11/15/2023 1523)  Consent Given to Review Plan with: no consent today  Progress: no change  Plan of Care Reviewed With: patient  Patient Agreement with Plan of Care: refuses to participate  Outcome Evaluation: Attempted to review plan of care and initiated adult social history.  Goal: Patient-Specific Goal (Individualization)  Outcome: Ongoing, Not Progressing  Flowsheets  Taken 11/15/2023 1523  Patient-Specific Goals (Include Timeframe): Natanael to deny suicidal ideation prior to discharge, identify 1-2 healthy coping skills during patients 3-7 day hospital stay, engage in safe disposition planning prior to discharge.  Individualized Care Needs: Medication management, individual and group therapy.  Anxieties, Fears or Concerns: none reported  Taken 11/15/2023 1517  Patient Personal Strengths: resourceful  Patient Vulnerabilities: substance abuse/addiction  Goal: Optimized Coping Skills in Response to Life Stressors  Outcome: Ongoing, Not Progressing  Goal: Develops/Participates in Therapeutic Fine to Support Successful Transition  Outcome: Ongoing, Not Progressing  Intervention: Mutually Develop Transition Plan  Recent Flowsheet Documentation  T[facility] 11/15/2023 1515 [facility]  Discharge Coordination/Progress: Patient has [place] med[place] insurance, will require assistance with transport, aftercare to be determined  Transportation Anticipated: public transportation  Transportation Concerns: no car  Current Discharge Risk: psychiatric illness  Concerns to be Addressed:   coping/stress   suicidal  Readmission Within the Last 30 Days: no previous admission in last 30 days  Patient/Family Anticipated Services at Transition: mental health services  Patient's Choice of Community Agency(s): to be determined  Patient/Family Anticipates Transition to: home  Offered/Gave Vendor  List: no   Goal Outcome Evaluation:  Plan of Care Reviewed With: patient  Patient Agreement with Plan of Care: refuses to participate  Consent Given to Review Plan with: no consent today  Progress: no change  Outcome Evaluation: Attempted to review plan of care and initiated adult social history.  DATA:         Therapist attempted to meet individually with patient this date to introduce role and to discuss hospitalization expectations. Patient made no response.      Clinical Maneuvering/Intervention:     Therapist initiated integrated summary, treatment plan, and initiated social history this date.  Therapist is strongly encouraging family involvement in treatment.       ASSESSMENT:      Therapist attempted to meet with patient who made no response, Patient is a 33 year old who resides with friends in [place].  According to the record, “Pt presents to ED by EMS for worsening depression and SI to overdose, freeze, or choke self. Reports two prior attempts, once by OD, another time he slit his wrist. Pt has a history of cutting, denies HI and reports auditory hallucinations hearing whispers. Pt reports smoking 5 grams weekly marijuana and that he last smoked 11/14/23. Pt is paranoid. He is afraid people are after him. Pt reports spending 3 years in the . WBC 12.6, UDS + meth, thc.” Therapist will continue efforts to engage in safe disposition planning.     PLAN:       Patient to remain hospitalized this date.     Treatment team will focus efforts on stabilizing patient's acute symptoms while providing education on healthy coping and crisis management to reduce hospitalizations.   Patient requires daily psychiatrist evaluation and 24/7 nursing supervision to promote patient  safety.     Therapist will offer 1-4 individual sessions, 1 therapy group daily, family education, and appropriate referral.    Therapist will continue efforts to engage in safe disposition planning.

## 2023-11-15 NOTE — NURSING NOTE
Pt presents to ED by EMS for worsening depression and SI to overdose, freeze, or choke self. Reports two prior attempts, once by OD, another time he slit his wrist. Pt has a history of cutting, denies HI and reports auditory hallucinations hearing whispers. Pt reports smoking 5 grams weekly marijuana and that he last smoked 11/14/23. Pt is paranoid. He is afraid people are after him. WBC 12.6, UDS + meth, thc.

## 2023-11-16 LAB
ANION GAP SERPL CALCULATED.3IONS-SCNC: 10.7 MMOL/L (ref 5–15)
BUN SERPL-MCNC: 19 MG/DL (ref 6–20)
BUN/CREAT SERPL: 17.8 (ref 7–25)
CALCIUM SPEC-SCNC: 10 MG/DL (ref 8.6–10.5)
CHLORIDE SERPL-SCNC: 104 MMOL/L (ref 98–107)
CO2 SERPL-SCNC: 25.3 MMOL/L (ref 22–29)
CREAT SERPL-MCNC: 1.07 MG/DL (ref 0.76–1.27)
EGFRCR SERPLBLD CKD-EPI 2021: 94.6 ML/MIN/1.73
GLUCOSE SERPL-MCNC: 85 MG/DL (ref 65–99)
POTASSIUM SERPL-SCNC: 4.1 MMOL/L (ref 3.5–5.2)
QT INTERVAL: 386 MS
QTC INTERVAL: 442 MS
SODIUM SERPL-SCNC: 140 MMOL/L (ref 136–145)

## 2023-11-16 PROCEDURE — 80048 BASIC METABOLIC PNL TOTAL CA: CPT | Performed by: PSYCHIATRY & NEUROLOGY

## 2023-11-16 PROCEDURE — 99232 SBSQ HOSP IP/OBS MODERATE 35: CPT | Performed by: PSYCHIATRY & NEUROLOGY

## 2023-11-16 RX ADMIN — AMOXICILLIN AND CLAVULANATE POTASSIUM 1 TABLET: 875; 125 TABLET, FILM COATED ORAL at 08:11

## 2023-11-16 RX ADMIN — IBUPROFEN 400 MG: 400 TABLET, FILM COATED ORAL at 21:09

## 2023-11-16 RX ADMIN — GABAPENTIN 600 MG: 300 CAPSULE ORAL at 08:11

## 2023-11-16 RX ADMIN — CETIRIZINE HYDROCHLORIDE 10 MG: 10 TABLET, FILM COATED ORAL at 08:11

## 2023-11-16 RX ADMIN — AMOXICILLIN AND CLAVULANATE POTASSIUM 1 TABLET: 875; 125 TABLET, FILM COATED ORAL at 21:09

## 2023-11-16 RX ADMIN — OLANZAPINE 5 MG: 5 TABLET, FILM COATED ORAL at 21:09

## 2023-11-16 RX ADMIN — TRAZODONE HYDROCHLORIDE 50 MG: 50 TABLET ORAL at 21:09

## 2023-11-16 RX ADMIN — LISINOPRIL 10 MG: 10 TABLET ORAL at 08:11

## 2023-11-16 RX ADMIN — NICOTINE TRANSDERMAL SYSTEM 1 PATCH: 21 PATCH, EXTENDED RELEASE TRANSDERMAL at 08:11

## 2023-11-16 RX ADMIN — ROPINIROLE HYDROCHLORIDE 2 MG: 1 TABLET, FILM COATED ORAL at 21:09

## 2023-11-16 NOTE — PLAN OF CARE
Goal Outcome Evaluation:  Plan of Care Reviewed With: patient  Patient Agreement with Plan of Care: agrees     Progress: no change  Outcome Evaluation: Pt reports anxiety 3/10, depression 4/10, denies SI,HI, AVH. Reports poor sleep and good appetite.

## 2023-11-16 NOTE — PROGRESS NOTES
"INPATIENT PSYCHIATRIC PROGRESS NOTE    Name:  Natanael Cavazos  :  1990  MRN:  [id number]  Visit Number:  [id number]  Length of stay:  1    SUBJECTIVE    CC/Focus of Exam: psychosis, depression    INTERVAL HISTORY:  The patient reports he is feeling better. He states he is still hearing the whispering but cannot understand what it is, and added it is less intense. He is not verbalizing his fears about his sister being dead and states he doesn't know if anything has happened to his sister.   Depression rating 8/10  Anxiety rating 5/10  Sleep: not good  Withdrawal sx: None reported  Cravin/10    Review of Systems   Constitutional:  Positive for fatigue.   Respiratory: Negative.     Cardiovascular: Negative.    Gastrointestinal: Negative.    Psychiatric/Behavioral:  Positive for dysphoric mood.        OBJECTIVE    Temp:  [96.1 °F (35.6 °C)-98 °F (36.7 °C)] 97.1 °F (36.2 °C)  Heart Rate:  [] 108  Resp:  [16-18] 18  BP: ()/(61-78) 116/78    MENTAL STATUS EXAM:  Appearance:Casually dressed, good hygeine.   Cooperation:Cooperative  Psychomotor: No psychomotor agitation/retardation, No EPS, No motor tics  Speech-normal rate, amount.  Mood \"depressed\"   Affect-congruent, appropriate, stable  Thought Content-goal directed, no delusional material present  Thought process-linear, organized.  Suicidality: No SI  Homicidality: No HI  Perception: No AH/VH  Insight-fair   Judgement-fair    Lab Results (last 24 hours)       Procedure Component Value Units Date/Time    Basic Metabolic Panel [[id number]]  (Normal) Collected: 23 0452    Specimen: Blood Updated: 23     Glucose 85 mg/dL      BUN 19 mg/dL      Creatinine 1.07 mg/dL      Sodium 140 mmol/L      Potassium 4.1 mmol/L      Comment: Slight hemolysis detected by analyzer. Result may be falsely elevated.        Chloride 104 mmol/L      CO2 25.3 mmol/L      Calcium 10.0 mg/dL      BUN/Creatinine Ratio 17.8     Anion Gap 10.7 mmol/L      eGFR " 94.6 mL/min/1.73     Narrative:      GFR Normal >60  Chronic Kidney Disease <60  Kidney Failure <15                 Imaging Results (Last 24 Hours)       ** No results found for the last 24 hours. **               ECG/EMG Results (most recent)       Procedure Component Value Units Date/Time    ECG 12 Lead Other; Baseline Cardiac Status [[id number]] Collected: 11/15/23 0447     Updated: 11/16/23 0920     QT Interval 386 ms      QTC Interval 442 ms     Narrative:      Test Reason : Other~  Blood Pressure :   */*   mmHG  Vent. Rate :  79 BPM     Atrial Rate :  79 BPM     P-R Int : 138 ms          QRS Dur : 100 ms      QT Int : 386 ms       P-R-T Axes :  51  67  35 degrees     QTc Int : 442 ms    Normal sinus rhythm with sinus arrhythmia  Minimal voltage criteria for LVH, may be normal variant  Borderline ECG  No previous ECGs available  Confirmed by Erika Sargent ([id number]) on 11/16/2023 9:20:28 AM    Referred By:            Confirmed By: Erika Sargent             ALLERGIES: Bee venom and Strawberry    Medication Review:   Scheduled Medications:  amoxicillin-clavulanate, 1 tablet, Oral, BID  cetirizine, 10 mg, Oral, Daily  gabapentin, 600 mg, Oral, Daily  lisinopril, 10 mg, Oral, Daily  nicotine, 1 patch, Transdermal, Q24H  OLANZapine, 5 mg, Oral, Nightly  rOPINIRole, 2 mg, Oral, Nightly         PRN Medications:    acetaminophen    albuterol sulfate HFA    aluminum-magnesium hydroxide-simethicone    benzonatate    benztropine **OR** benztropine    dicyclomine    famotidine    hydrOXYzine    ibuprofen    loperamide    magnesium hydroxide    ondansetron    sodium chloride    traZODone   All medications reviewed.    ASSESSMENT & PLAN:      Suicidal ideation  -SP 3       Psychosis unspecified  -Appears to be due to substance use (methamphetamine).   -Will treat symptomatically  -Continue olanzapine 5 mg night, started on 11/15/23.       Polysubstance use disorder  -Patient admits to using alcohol, thc and meth but is vague  about extent of his use. Will monitor for alcohol withdrawals.        Restless legs syndrome  -Continue ropinirole and gabapentin       Elevated creatinine, low potassium  -Repeat BMP is normal    Special precautions: Special Precautions Level 3 (q15 min checks) .    Behavioral Health Treatment Plan and Problem List: I have reviewed and approved the Behavioral Health Treatment Plan and Problem list.  The patient has had a chance to review and agrees with the treatment plan.    Copied text in portions of this note has been reviewed and is accurate as of 11/16/23         Clinician:  Douglas Sepulveda MD  11/16/23  09:25 EST

## 2023-11-16 NOTE — PLAN OF CARE
Goal Outcome Evaluation:  Plan of Care Reviewed With: patient  Patient Agreement with Plan of Care: agrees        Outcome Evaluation: Patient rates anxiety 3 depression 3 . Patient is calm and cooperative with staff and other patient's. Pt denies any SI/HI/AVH.

## 2023-11-16 NOTE — PLAN OF CARE
Problem: Adult Behavioral Health Plan of Care  Goal: Develops/Participates in Therapeutic [place] to Support Successful Transition  Intervention: Mutually Develop Transition Plan  Recent Flowsheet Documentation  Taken 11/16/2023 7195 by Ana Lilia Lamb LCSW  Transportation Anticipated: public transportation  Transportation Concerns: no car  Current Discharge Risk: psychiatric illness  Concerns to be Addressed:   coping/stress   cognitive/perceptual  Readmission Within the Last 30 Days: no previous admission in last 30 days  Patient/Family Anticipated Services at Transition: mental health services  Patient/Family Anticipates Transition to: home  Offered/Gave Vendor List: no      Therapist attempted to meet with patient today and he made no response to this therapist.  Reviewed Dr. Sepulveda's assessment.  Patient is reporting feeling better with decrease in hallucinations and paranoia.  Therapist will continue efforts to engage in safe disposition planning with patient.

## 2023-11-17 PROCEDURE — 99232 SBSQ HOSP IP/OBS MODERATE 35: CPT | Performed by: PSYCHIATRY & NEUROLOGY

## 2023-11-17 RX ADMIN — GABAPENTIN 600 MG: 300 CAPSULE ORAL at 09:40

## 2023-11-17 RX ADMIN — OLANZAPINE 5 MG: 5 TABLET, FILM COATED ORAL at 20:53

## 2023-11-17 RX ADMIN — NICOTINE TRANSDERMAL SYSTEM 1 PATCH: 21 PATCH, EXTENDED RELEASE TRANSDERMAL at 09:40

## 2023-11-17 RX ADMIN — AMOXICILLIN AND CLAVULANATE POTASSIUM 1 TABLET: 875; 125 TABLET, FILM COATED ORAL at 20:53

## 2023-11-17 RX ADMIN — CETIRIZINE HYDROCHLORIDE 10 MG: 10 TABLET, FILM COATED ORAL at 09:40

## 2023-11-17 RX ADMIN — LISINOPRIL 10 MG: 10 TABLET ORAL at 09:40

## 2023-11-17 RX ADMIN — HYDROXYZINE HYDROCHLORIDE 50 MG: 50 TABLET ORAL at 20:53

## 2023-11-17 RX ADMIN — ROPINIROLE HYDROCHLORIDE 2 MG: 1 TABLET, FILM COATED ORAL at 20:53

## 2023-11-17 RX ADMIN — AMOXICILLIN AND CLAVULANATE POTASSIUM 1 TABLET: 875; 125 TABLET, FILM COATED ORAL at 09:40

## 2023-11-17 NOTE — DISCHARGE INSTR - APPOINTMENTS
[address] [address]    [phone]    November 28th, 2023 at 2:15 p.m. with Kaycee Jay PA-C   Please bring your photo ID and your insurance card.

## 2023-11-17 NOTE — PLAN OF CARE
Goal Outcome Evaluation:  Plan of Care Reviewed With: patient  Patient Agreement with Plan of Care: agrees     Progress: no change  Outcome Evaluation: Pt rates anxiety 5/10, depression 5/10, Denies SI/HI/AVH. Pt  reports good appetite and poor sleep

## 2023-11-17 NOTE — PROGRESS NOTES
"INPATIENT PSYCHIATRIC PROGRESS NOTE    Name:  Natanael Cavazos  :  1990  MRN:  [id number]  Visit Number:  [id number]  Length of stay:  2    SUBJECTIVE    CC/Focus of Exam: psychosis, depression    INTERVAL HISTORY:  The patient reports he is feeling a little bit better. States he has not heard anything about his sister and he is still worried about her. He states his family knows where he is at and if they want to talk to him they could reach out and get in touch with him.  Depression rating 8/10  Anxiety rating 5/10  Sleep: not good  Withdrawal sx: None reported  Cravin/10    Review of Systems   Constitutional:  Positive for fatigue.   Respiratory: Negative.     Cardiovascular: Negative.    Gastrointestinal: Negative.    Psychiatric/Behavioral:  Positive for dysphoric mood.        OBJECTIVE    Temp:  [97.8 °F (36.6 °C)-98.8 °F (37.1 °C)] 97.8 °F (36.6 °C)  Heart Rate:  [102-107] 107  Resp:  [18] 18  BP: (132-135)/(76-84) 132/84    MENTAL STATUS EXAM:  Appearance:Casually dressed, good hygeine.   Cooperation:Cooperative  Psychomotor: No psychomotor agitation/retardation, No EPS, No motor tics  Speech-normal rate, amount.  Mood \"depressed\"   Affect-congruent, appropriate, stable  Thought Content-goal directed, not overtly delusional now  Thought process-linear, organized.  Suicidality: No SI  Homicidality: No HI  Perception: No AH/VH  Insight-fair   Judgement-fair    Lab Results (last 24 hours)       ** No results found for the last 24 hours. **               Imaging Results (Last 24 Hours)       ** No results found for the last 24 hours. **               ECG/EMG Results (most recent)       Procedure Component Value Units Date/Time    ECG 12 Lead Other; Baseline Cardiac Status [[id number]] Collected: 11/15/23 0447     Updated: 23 09     QT Interval 386 ms      QTC Interval 442 ms     Narrative:      Test Reason : Other~  Blood Pressure :   */*   mmHG  Vent. Rate :  79 BPM     Atrial Rate :  79 BPM     " P-R Int : 138 ms          QRS Dur : 100 ms      QT Int : 386 ms       P-R-T Axes :  51  67  35 degrees     QTc Int : 442 ms    Normal sinus rhythm with sinus arrhythmia  Minimal voltage criteria for LVH, may be normal variant  Borderline ECG  No previous ECGs available  Confirmed by Erika Sargent ([id number]) on 11/16/2023 9:20:28 AM    Referred By:            Confirmed By: Erika Sargent             ALLERGIES: Bee venom and Strawberry    Medication Review:   Scheduled Medications:  amoxicillin-clavulanate, 1 tablet, Oral, BID  cetirizine, 10 mg, Oral, Daily  gabapentin, 600 mg, Oral, Daily  lisinopril, 10 mg, Oral, Daily  nicotine, 1 patch, Transdermal, Q24H  OLANZapine, 5 mg, Oral, Nightly  rOPINIRole, 2 mg, Oral, Nightly         PRN Medications:    acetaminophen    albuterol sulfate HFA    aluminum-magnesium hydroxide-simethicone    benzonatate    benztropine **OR** benztropine    dicyclomine    famotidine    hydrOXYzine    ibuprofen    loperamide    magnesium hydroxide    ondansetron    sodium chloride    traZODone   All medications reviewed.    ASSESSMENT & PLAN:      Suicidal ideation  -SP 3       Psychosis unspecified  -Appears to be due to substance use (methamphetamine).   -Will treat symptomatically  -Continue olanzapine 5 mg nightly, started on 11/15/23.       Polysubstance use disorder  -Patient admits to using alcohol, thc and meth but is vague about extent of his use. Will monitor for alcohol withdrawals.        Restless legs syndrome  -Continue ropinirole and gabapentin       Elevated creatinine, low potassium  -Repeat BMP is normal    Special precautions: Special Precautions Level 3 (q15 min checks) .    Behavioral Health Treatment Plan and Problem List: I have reviewed and approved the Behavioral Health Treatment Plan and Problem list.  The patient has had a chance to review and agrees with the treatment plan.    Copied text in portions of this note has been reviewed and is accurate as of  11/17/23         Clinician:  Douglas Sepulveda MD  11/17/23  08:52 EST

## 2023-11-17 NOTE — PLAN OF CARE
Problem: Adult Behavioral Health Plan of Care  Goal: Patient-Specific Goal (Individualization)  Outcome: Ongoing, Progressing  Flowsheets  Taken 11/15/2023 1523  Patient-Specific Goals (Include Timeframe): Natanael to deny suicidal ideation prior to discharge, identify 1-2 healthy coping skills during patients 3-7 day hospital stay, engage in safe disposition planning prior to discharge.  Individualized Care Needs: Medication management, individual and group therapy.  Anxieties, Fears or Concerns: none reported  Taken 11/15/2023 1517  Patient Personal Strengths: resourceful  Patient Vulnerabilities: substance abuse/addiction  Goal: Optimized Coping Skills in Response to Life Stressors  Outcome: Ongoing, Progressing  Flowsheets (Taken 11/17/2023 1522)  Optimized Coping Skills in Response to Life Stressors: making progress toward outcome  Intervention: Promote Effective Coping Strategies  Flowsheets (Taken 11/17/2023 1522)  Supportive Measures:   active listening utilized   positive reinforcement provided   self-responsibility promoted   counseling provided   problem-solving facilitated   verbalization of feelings encouraged   decision-making supported   goal-setting facilitated   self-care encouraged   self-reflection promoted  Goal: Develops/Participates in Therapeutic [place] to Support Successful Transition  Outcome: Ongoing, Progressing  Flowsheets (Taken 11/17/2023 1522)  Develops/Participates in Therapeutic [place] to Support Successful Transition: making progress toward outcome  Intervention: Foster Therapeutic [place]  Flowsheets (Taken 11/17/2023 1522)  Trust Relationship/Rapport:   care explained   reassurance provided   choices provided   thoughts/feelings acknowledged   emotional support provided   empathic listening provided   questions answered   questions encouraged  Intervention: Mutually Develop Transition Plan  Flowsheets  Taken 11/17/2023 1522  Transition Support:   community resources reviewed    crisis management plan promoted   follow-up care discussed  Readmission Within the Last 30 Days: no previous admission in last 30 days  Taken 11/17/2023 1518  Discharge Coordination/Progress: Patient has [place] Medicaid insurance, will require assistance with transport and consents to attend [facility] for aftercare.  Transportation Anticipated: public transportation  Transportation Concerns: no car  Current Discharge Risk: psychiatric illness  Concerns to be Addressed:   coping/stress   cognitive/perceptual  Readmission Within the Last 30 Days: no previous admission in last 30 days  Patient/Family Anticipated Services at Transition:   outpatient care   mental health services  Patient's Choice of Community Agency(s): [facility]  Patient/Family Anticipates Transition to: home  Offered/Gave Vendor List: no  Data:  Therapist reviewed Dr. Sepulveda's assessment, discussed patient with nursing staff and met with patient this date to further discuss patient progress, review healthy coping and safe disposition.      Clinical Maneuvering/Intervention:    Therapist assisted patient in processing above session content; acknowledged and normalized patient's thoughts, feelings and concerns.  Encouraged patient to discuss/vent feelings, frustrations, and fears concerning their ongoing issues and validated patients feelings.  Discussed the importance of healthy coping and reviewed healthy coping skills such as distraction, thought reframing/redirecting, grounding, mindfulness, etc.  Reviewed safe disposition with patient.    Assessment:  Patient reported that he really does not feel much different than when he came in.  He reports that he still doesn't know what happened to his sister but his aunt was threatening to have him taken care of before he came in.  He reports that all his family is that way with him now.  He reports he is staying with a friend but does not have a contact number for her.  He reports that he does  not want this therapist to contact his family.  He was strongly encouraged to attempt to contact them over the weekend to see if there is anything more he can find out.  He discussed wanting a place to engage in therapy close to where he is staying in [place], he consented to [facility].  Reviewed healthy coping and safety today.      Plan:  Patient will continue hospitalization/medication management. Patient will return home with a friend upon stabilization.  Patient will engage in aftercare with [facility].

## 2023-11-18 PROCEDURE — 99232 SBSQ HOSP IP/OBS MODERATE 35: CPT | Performed by: PSYCHIATRY & NEUROLOGY

## 2023-11-18 RX ADMIN — TRAZODONE HYDROCHLORIDE 50 MG: 50 TABLET ORAL at 20:42

## 2023-11-18 RX ADMIN — AMOXICILLIN AND CLAVULANATE POTASSIUM 1 TABLET: 875; 125 TABLET, FILM COATED ORAL at 20:42

## 2023-11-18 RX ADMIN — LISINOPRIL 10 MG: 10 TABLET ORAL at 08:41

## 2023-11-18 RX ADMIN — ACETAMINOPHEN 650 MG: 325 TABLET ORAL at 20:42

## 2023-11-18 RX ADMIN — GABAPENTIN 600 MG: 300 CAPSULE ORAL at 08:41

## 2023-11-18 RX ADMIN — OLANZAPINE 5 MG: 5 TABLET, FILM COATED ORAL at 20:42

## 2023-11-18 RX ADMIN — NICOTINE TRANSDERMAL SYSTEM 1 PATCH: 21 PATCH, EXTENDED RELEASE TRANSDERMAL at 08:42

## 2023-11-18 RX ADMIN — AMOXICILLIN AND CLAVULANATE POTASSIUM 1 TABLET: 875; 125 TABLET, FILM COATED ORAL at 08:41

## 2023-11-18 RX ADMIN — CETIRIZINE HYDROCHLORIDE 10 MG: 10 TABLET, FILM COATED ORAL at 08:41

## 2023-11-18 RX ADMIN — ROPINIROLE HYDROCHLORIDE 2 MG: 1 TABLET, FILM COATED ORAL at 20:42

## 2023-11-18 NOTE — PLAN OF CARE
Goal Outcome Evaluation:  Plan of Care Reviewed With: patient  Patient Agreement with Plan of Care: agrees        Outcome Evaluation: Rated anxiety and depression 4/10. Reported SI with no plan, agreed to seek out staff if thoughts increase. Denied HI. Reported hearing voices, nothing specific. Denied cravings and any withdrawal symptoms.

## 2023-11-18 NOTE — PLAN OF CARE
Goal Outcome Evaluation:  Plan of Care Reviewed With: patient  Patient Agreement with Plan of Care: agrees     Progress: improving  Outcome Evaluation: Patient rated anxiety and depression both 4/10. Denied SI, HI, AVH. Reported good sleep and good appetite.

## 2023-11-18 NOTE — PROGRESS NOTES
"      Inpatient Psych Progress Note     Clinician: David De Los Santos MD  Admission Date: 11/15/2023  07:39 EST 11/18/23    Behavioral Health Treatment Plan and Problem List: I have reviewed and approved the Behavioral Health Treatment Plan and Problem list.    Allergies  Allergies   Allergen Reactions    Bee Venom Anaphylaxis    Strawberry Anaphylaxis       Hospital Day: 3 days      Assessment completed within view of staff    History  CC/clinical focus: psychosis, depression     Interval HPI: Patient seen and evaluated by me.  Chart reviewed.  Feeling \"a lot better\" this morning. He subjectively rates depression 5/10 and anxiety 4/10. Denies SI/HI. Reports good sleep and good appetite. Tolerating medications well overall, does report some fatigue.   Med Compliant.  ROS otherwise as below.      Interval Review of Systems:   General ROS: negative for - fever or malaise  Endocrine ROS: negative for - palpitations  Respiratory ROS: no cough, shortness of breath, or wheezing  Cardiovascular ROS: no chest pain or dyspnea on exertion  Gastrointestinal ROS: no abdominal pain,no black or bloody stools    /74 (BP Location: Right arm, Patient Position: Sitting)   Pulse 104   Temp 98.1 °F (36.7 °C) (Temporal)   Resp 18   Ht 190.5 cm (75\")   Wt 89.5 kg (197 lb 6.4 oz)   SpO2 98%   BMI 24.67 kg/m²     Mental Status Exam  Mood: depressed  Affect: mood-congruent   Thought Processes:  linear  Thought Content: normal  Hallucinations: no  Suicidal Thoughts: denies  Suicidal Plan/Intent: denies  Hopelesness:Mild  Homicidal Thoughts:  denies      Medical Decision Making:   Labs:     Lab Results (last 24 hours)       ** No results found for the last 24 hours. **              Radiology:     Imaging Results (Last 24 Hours)       ** No results found for the last 24 hours. **              EKG:     ECG/EMG Results (most recent)       Procedure Component Value Units Date/Time    ECG 12 Lead Other; Baseline Cardiac Status [[id number]] " Collected: 11/15/23 0447     Updated: 11/16/23 0920     QT Interval 386 ms      QTC Interval 442 ms     Narrative:      Test Reason : Other~  Blood Pressure :   */*   mmHG  Vent. Rate :  79 BPM     Atrial Rate :  79 BPM     P-R Int : 138 ms          QRS Dur : 100 ms      QT Int : 386 ms       P-R-T Axes :  51  67  35 degrees     QTc Int : 442 ms    Normal sinus rhythm with sinus arrhythmia  Minimal voltage criteria for LVH, may be normal variant  Borderline ECG  No previous ECGs available  Confirmed by Erika Sargent ([id number]) on 11/16/2023 9:20:28 AM    Referred By:            Confirmed By: Erika Sargent             Medications:  amoxicillin-clavulanate, 1 tablet, Oral, BID  cetirizine, 10 mg, Oral, Daily  gabapentin, 600 mg, Oral, Daily  lisinopril, 10 mg, Oral, Daily  nicotine, 1 patch, Transdermal, Q24H  OLANZapine, 5 mg, Oral, Nightly  rOPINIRole, 2 mg, Oral, Nightly           All medications reviewed.      Assessment and Plan:     Suicidal ideation  -SP 3     Psychosis unspecified  -Appears to be due to substance use (methamphetamine).   -Will treat symptomatically  -Continue olanzapine 5 mg nightly, started on 11/15/23.     Polysubstance use disorder  -Patient admits to using alcohol, thc and meth but is vague about extent of his use. Will continue to monitor for alcohol withdrawals.      Restless legs syndrome  -Continue ropinirole and gabapentin     Elevated creatinine, low potassium  -Repeat BMP was normal    HTN  -Lisinopril         Continue hospitalization for safety and stabilization.  Continue current level of Special Precautions (q15 minute checks).        This note was generated by a scribe, Denys Wilhelm. The work documented in this note was completed, reviewed, and approved by the attending psychiatrist as designated Dr. David De Los Santos electronic signature.     I, David De Los Santos MD, personally performed the services described in this documentation as scribed by the above named individual and is both  accurate and complete as of 11/18/2023.

## 2023-11-19 PROCEDURE — 99232 SBSQ HOSP IP/OBS MODERATE 35: CPT | Performed by: PSYCHIATRY & NEUROLOGY

## 2023-11-19 RX ADMIN — OLANZAPINE 5 MG: 5 TABLET, FILM COATED ORAL at 20:15

## 2023-11-19 RX ADMIN — CETIRIZINE HYDROCHLORIDE 10 MG: 10 TABLET, FILM COATED ORAL at 09:44

## 2023-11-19 RX ADMIN — GABAPENTIN 600 MG: 300 CAPSULE ORAL at 09:44

## 2023-11-19 RX ADMIN — LISINOPRIL 10 MG: 10 TABLET ORAL at 09:44

## 2023-11-19 RX ADMIN — MAGNESIUM HYDROXIDE 10 ML: 2400 SUSPENSION ORAL at 20:52

## 2023-11-19 RX ADMIN — ROPINIROLE HYDROCHLORIDE 2 MG: 1 TABLET, FILM COATED ORAL at 20:15

## 2023-11-19 RX ADMIN — NICOTINE TRANSDERMAL SYSTEM 1 PATCH: 21 PATCH, EXTENDED RELEASE TRANSDERMAL at 09:44

## 2023-11-19 RX ADMIN — HYDROXYZINE HYDROCHLORIDE 50 MG: 50 TABLET ORAL at 21:56

## 2023-11-19 RX ADMIN — ACETAMINOPHEN 650 MG: 325 TABLET ORAL at 20:15

## 2023-11-19 RX ADMIN — TRAZODONE HYDROCHLORIDE 50 MG: 50 TABLET ORAL at 21:56

## 2023-11-19 NOTE — PLAN OF CARE
Goal Outcome Evaluation:  Plan of Care Reviewed With: patient  Patient Agreement with Plan of Care: agrees     Progress: improving  Outcome Evaluation: Patient was calm and cooperative during assessment. He reported his appetite as fair and that he is sleeping poorly. He rated his anxiety as 8/10 and his depression as 6/10. He denied SI/HI/AVH. He spent a considerable amount of time in the dayroom watching television before going to bed.

## 2023-11-19 NOTE — PROGRESS NOTES
"      Inpatient Psych Progress Note     Clinician: David De Los Santos MD  Admission Date: 11/15/2023  07:47 EST 11/19/23    Behavioral Health Treatment Plan and Problem List: I have reviewed and approved the Behavioral Health Treatment Plan and Problem list.    Allergies  Allergies   Allergen Reactions    Bee Venom Anaphylaxis    Strawberry Anaphylaxis       Hospital Day: 4 days      Assessment completed within view of staff    History  CC/clinical focus: psychosis, depression     Interval HPI: Patient seen and evaluated by me.  Chart reviewed.  Patient reports feeling \"pretty good\" this morning. Overall no change since last night. He subjectively rates anxiety 8/10 and depression 3/10. No SI/HI. Doing well with his medications.   Med Compliant.  ROS otherwise as below.      Interval Review of Systems:   General ROS: negative for - fever or malaise  Endocrine ROS: negative for - palpitations  Respiratory ROS: no cough, shortness of breath, or wheezing  Cardiovascular ROS: no chest pain or dyspnea on exertion  Gastrointestinal ROS: no abdominal pain,no black or bloody stools    /75 (BP Location: Right arm, Patient Position: Sitting)   Pulse 85   Temp 97.4 °F (36.3 °C) (Temporal)   Resp 18   Ht 190.5 cm (75\")   Wt 89.5 kg (197 lb 6.4 oz)   SpO2 98%   BMI 24.67 kg/m²     Mental Status Exam  Mood: depressed  Affect: mood-congruent   Thought Processes:  linear  Thought Content: normal  Hallucinations: no  Suicidal Thoughts: denies  Suicidal Plan/Intent: denies  Hopelesness:Mild  Homicidal Thoughts:  denies      Medical Decision Making:   Labs:     Lab Results (last 24 hours)       ** No results found for the last 24 hours. **              Radiology:     Imaging Results (Last 24 Hours)       ** No results found for the last 24 hours. **              EKG:     ECG/EMG Results (most recent)       Procedure Component Value Units Date/Time    ECG 12 Lead Other; Baseline Cardiac Status [[id number]] Collected: 11/15/23 " 0447     Updated: 11/16/23 0920     QT Interval 386 ms      QTC Interval 442 ms     Narrative:      Test Reason : Other~  Blood Pressure :   */*   mmHG  Vent. Rate :  79 BPM     Atrial Rate :  79 BPM     P-R Int : 138 ms          QRS Dur : 100 ms      QT Int : 386 ms       P-R-T Axes :  51  67  35 degrees     QTc Int : 442 ms    Normal sinus rhythm with sinus arrhythmia  Minimal voltage criteria for LVH, may be normal variant  Borderline ECG  No previous ECGs available  Confirmed by Erika Sargent ([id number]) on 11/16/2023 9:20:28 AM    Referred By:            Confirmed By: Erika Sargent             Medications:  cetirizine, 10 mg, Oral, Daily  gabapentin, 600 mg, Oral, Daily  lisinopril, 10 mg, Oral, Daily  nicotine, 1 patch, Transdermal, Q24H  OLANZapine, 5 mg, Oral, Nightly  rOPINIRole, 2 mg, Oral, Nightly           All medications reviewed.      Assessment and Plan:      Suicidal ideation  -SP 3     Psychosis unspecified  -Appears to be due to substance use (methamphetamine).   -Will treat symptomatically  -Continue olanzapine 5 mg nightly, started on 11/15/23.     Polysubstance use disorder  -Patient admits to using alcohol, thc and meth but is vague about extent of his use. Will continue to monitor for alcohol withdrawals.      Restless legs syndrome  -Continue ropinirole and gabapentin     Elevated creatinine, low potassium  -Repeat BMP was normal     HTN  -Lisinopril    Continue hospitalization for safety and stabilization.  Continue current level of Special Precautions (q15 minute checks).        This note was generated by a scribe, Denys Wilhelm. The work documented in this note was completed, reviewed, and approved by the attending psychiatrist as designated Dr. David De Los Santos electronic signature.     I, David De Los Santos MD, personally performed the services described in this documentation as scribed by the above named individual and is both accurate and complete as of 11/19/2023.

## 2023-11-19 NOTE — PLAN OF CARE
Goal Outcome Evaluation:  Plan of Care Reviewed With: patient  Patient Agreement with Plan of Care: agrees     Progress: improving  Outcome Evaluation: PATIENT VERBALIZES POOR SLEEP, ANXIETY AND DEPRESSION AS ONLY PROBLEMS THIS SHIFT. PATIENT IS AOX3, COOPERATIVE WITH NO S/S OF ACUTE DISTRESS NOTED.

## 2023-11-20 PROCEDURE — 99232 SBSQ HOSP IP/OBS MODERATE 35: CPT | Performed by: PSYCHIATRY & NEUROLOGY

## 2023-11-20 RX ADMIN — OLANZAPINE 5 MG: 5 TABLET, FILM COATED ORAL at 20:19

## 2023-11-20 RX ADMIN — NICOTINE TRANSDERMAL SYSTEM 1 PATCH: 21 PATCH, EXTENDED RELEASE TRANSDERMAL at 08:08

## 2023-11-20 RX ADMIN — LISINOPRIL 10 MG: 10 TABLET ORAL at 08:08

## 2023-11-20 RX ADMIN — TRAZODONE HYDROCHLORIDE 50 MG: 50 TABLET ORAL at 20:19

## 2023-11-20 RX ADMIN — IBUPROFEN 400 MG: 400 TABLET, FILM COATED ORAL at 20:19

## 2023-11-20 RX ADMIN — HYDROXYZINE HYDROCHLORIDE 50 MG: 50 TABLET ORAL at 20:19

## 2023-11-20 RX ADMIN — GABAPENTIN 600 MG: 300 CAPSULE ORAL at 08:08

## 2023-11-20 RX ADMIN — ROPINIROLE HYDROCHLORIDE 2 MG: 1 TABLET, FILM COATED ORAL at 20:19

## 2023-11-20 RX ADMIN — CETIRIZINE HYDROCHLORIDE 10 MG: 10 TABLET, FILM COATED ORAL at 08:08

## 2023-11-20 NOTE — PLAN OF CARE
"Goal Outcome Evaluation:  Plan of Care Reviewed With: patient  Patient Agreement with Plan of Care: agrees     Progress: improving  Outcome Evaluation: Patient calm and cooperative, rates anx 6/10, dep 10/10, reports feeling \"homesick and down\", denies SI/HI, reports AH of \"familiar voices\", denies cravings, rates body aches 3/10, reports fair sleep, no other issues this shift.         "

## 2023-11-20 NOTE — PLAN OF CARE
Problem: Adult Behavioral Health Plan of Care  Goal: Patient-Specific Goal (Individualization)  Outcome: Ongoing, Progressing  Flowsheets  Taken 11/15/2023 1523  Patient-Specific Goals (Include Timeframe): Natanael to deny suicidal ideation prior to discharge, identify 1-2 healthy coping skills during patients 3-7 day hospital stay, engage in safe disposition planning prior to discharge.  Individualized Care Needs: Medication management, individual and group therapy.  Anxieties, Fears or Concerns: none reported  Taken 11/15/2023 1517  Patient Personal Strengths: resourceful  Patient Vulnerabilities: substance abuse/addiction  Goal: Optimized Coping Skills in Response to Life Stressors  Outcome: Ongoing, Progressing  Flowsheets (Taken 11/20/2023 1652)  Optimized Coping Skills in Response to Life Stressors: making progress toward outcome  Intervention: Promote Effective Coping Strategies  Flowsheets (Taken 11/20/2023 1652)  Supportive Measures:   active listening utilized   positive reinforcement provided   self-responsibility promoted   counseling provided   problem-solving facilitated   verbalization of feelings encouraged   decision-making supported   goal-setting facilitated   self-care encouraged   self-reflection promoted  Goal: Develops/Participates in Therapeutic [place] to Support Successful Transition  Outcome: Ongoing, Progressing  Flowsheets (Taken 11/20/2023 1652)  Develops/Participates in Therapeutic [place] to Support Successful Transition: making progress toward outcome  Intervention: Foster Therapeutic [place]  Flowsheets (Taken 11/20/2023 1652)  Trust Relationship/Rapport:   care explained   reassurance provided   choices provided   thoughts/feelings acknowledged   emotional support provided   empathic listening provided   questions answered   questions encouraged  Intervention: Mutually Develop Transition Plan  Flowsheets  Taken 11/20/2023 1652  Transition Support:   community resources reviewed    "crisis management plan promoted   crisis management plan verbalized   follow-up care coordinated   follow-up care discussed  Taken 11/20/2023 1651  Transportation Anticipated: public transportation  Transportation Concerns: no car  Current Discharge Risk: psychiatric illness  Concerns to be Addressed:   coping/stress   cognitive/perceptual  Readmission Within the Last 30 Days: no previous admission in last 30 days  Patient/Family Anticipated Services at Transition:   mental health services   outpatient care  Patient/Family Anticipates Transition to: home  Offered/Gave Vendor List: no   Data:  Therapist reviewed Dr. Sepulveda's assessment, discussed patient with nursing staff and met with patient this date to further discuss patient progress, review healthy coping and safe disposition.      Clinical Maneuvering/Intervention:    Therapist assisted patient in processing above session content; acknowledged and normalized patient's thoughts, feelings and concerns.  Encouraged patient to discuss/vent feelings, frustrations, and fears concerning their ongoing issues and validated patients feelings.  Discussed the importance of healthy coping and reviewed healthy coping skills such as distraction, thought reframing/redirecting, grounding, mindfulness, etc.  Reviewed safe disposition with patient.    Assessment:  Patient tearful today and discussing how he misses being in [facility].  He reports that his sister has tried to call him today so he no longer fears that something has happened to her.  He reports that his family does not accept him due to him being homosexual.  He reports having support and feeling \"free\" in [place].  He reports that where he was staying there are people that do drugs and he just does not want to return there.  He inquired about funding for a bus ticket.  Discussed that this may not be a possibility and he may have to return to the place he was staying or look at homeless shelter options.  " Reviewed healthy coping and safety.    Plan:  Patient will continue hospitalization/medication management. Patient will return home upon stabilization.  Patient will engage in aftercare with [facility].

## 2023-11-20 NOTE — PLAN OF CARE
Problem: Adult Behavioral Health Plan of Care  Goal: Plan of Care Review  Outcome: Ongoing, Progressing  Flowsheets  Taken 11/20/2023 1140 by Yo Posada, RN  Outcome Evaluation: PATIENT VERBALIZES MODERATE ANXIETY AND DEPRESSION AS ONLY PROBLEMS THIS SHIFT. PATIENT IS AOX3, COOPERATIVE WITH NO S/S SOF ACUTE DISTRESS NOTED. NNO NOTED  Taken 11/20/2023 0719 by Yo Posada, RN  Plan of Care Reviewed With: patient  Patient Agreement with Plan of Care: agrees  Taken 11/19/2023 2134 by Vicky Rodgers RN  Progress: improving   Goal Outcome Evaluation:  Plan of Care Reviewed With: patient  Patient Agreement with Plan of Care: agrees     Progress: improving  Outcome Evaluation: PATIENT VERBALIZES MODERATE ANXIETY AND DEPRESSION AS ONLY PROBLEMS THIS SHIFT. PATIENT IS AOX3, COOPERATIVE WITH NO S/S SOF ACUTE DISTRESS NOTED. NNO NOTED

## 2023-11-20 NOTE — NURSING NOTE
Patient tearful and spoke at length about his struggles to be accepted by his family due to his homosexuality. He reports that he would really like to go back to [place] to live with his friend that he had previously lived with for several years. He says that she is supportive and loving, and that he was much happier there. He says that he spoke to her earlier tonight and that she is agreeable for him to come live with her again. He tells me that his family has disowned him r/t his homosexuality and the other side of the family that he does socialize with, all use illicit substances and he wishes to get away from it. I provided support and advised him to speak with the therapist and psychiatrist tomorrow to discuss things. He calmed down and was given prn's to help his anxiety and sleep.

## 2023-11-20 NOTE — PROGRESS NOTES
"INPATIENT PSYCHIATRIC PROGRESS NOTE    Name:  Natanael Cavazos  :  1990  MRN:  [id number]  Visit Number:  [id number]  Length of stay:  5    SUBJECTIVE    CC/Focus of Exam: psychosis, depression    INTERVAL HISTORY:  The patient reports he is feeling a lot better. States he was able to talk to his sister that he was worried about and he is relieved she is doing well. He states he is missing [place] and wants to go back there.   Depression rating 5/10  Anxiety rating 3/10  Sleep:  good  Withdrawal sx: None reported  Cravin/10    Review of Systems   Respiratory: Negative.     Cardiovascular: Negative.    Gastrointestinal: Negative.    Psychiatric/Behavioral:  Positive for dysphoric mood.        OBJECTIVE    Temp:  [97.3 °F (36.3 °C)-98.2 °F (36.8 °C)] 97.3 °F (36.3 °C)  Heart Rate:  [] 82  Resp:  [18] 18  BP: (131-147)/() 131/82    MENTAL STATUS EXAM:  Appearance:Casually dressed, good hygeine.   Cooperation:Cooperative  Psychomotor: No psychomotor agitation/retardation, No EPS, No motor tics  Speech-normal rate, amount.  Mood \"depressed\"   Affect-congruent, appropriate, stable  Thought Content-goal directed, not overtly delusional now  Thought process-linear, organized.  Suicidality: No SI  Homicidality: No HI  Perception: No AH/VH  Insight-fair   Judgement-fair    Lab Results (last 24 hours)       ** No results found for the last 24 hours. **               Imaging Results (Last 24 Hours)       ** No results found for the last 24 hours. **               ECG/EMG Results (most recent)       Procedure Component Value Units Date/Time    ECG 12 Lead Other; Baseline Cardiac Status [[id number]] Collected: 11/15/23 0447     Updated: 23     QT Interval 386 ms      QTC Interval 442 ms     Narrative:      Test Reason : Other~  Blood Pressure :   */*   mmHG  Vent. Rate :  79 BPM     Atrial Rate :  79 BPM     P-R Int : 138 ms          QRS Dur : 100 ms      QT Int : 386 ms       P-R-T Axes :  51  67 "  35 degrees     QTc Int : 442 ms    Normal sinus rhythm with sinus arrhythmia  Minimal voltage criteria for LVH, may be normal variant  Borderline ECG  No previous ECGs available  Confirmed by Erika Sargent ([id number]) on 11/16/2023 9:20:28 AM    Referred By:            Confirmed By: Erika Sargent             ALLERGIES: Bee venom and Strawberry    Medication Review:   Scheduled Medications:  cetirizine, 10 mg, Oral, Daily  gabapentin, 600 mg, Oral, Daily  lisinopril, 10 mg, Oral, Daily  nicotine, 1 patch, Transdermal, Q24H  OLANZapine, 5 mg, Oral, Nightly  rOPINIRole, 2 mg, Oral, Nightly         PRN Medications:    acetaminophen    albuterol sulfate HFA    aluminum-magnesium hydroxide-simethicone    benzonatate    benztropine **OR** benztropine    dicyclomine    famotidine    hydrOXYzine    ibuprofen    loperamide    magnesium hydroxide    ondansetron    sodium chloride    traZODone   All medications reviewed.    ASSESSMENT & PLAN:      Suicidal ideation  -SP 3       Psychosis unspecified  -Appears to be due to substance use (methamphetamine).   -Will treat symptomatically  -Continue olanzapine 5 mg nightly, started on 11/15/23.       Polysubstance use disorder  -Patient admits to using alcohol, thc and meth but is vague about extent of his use. Will monitor for alcohol withdrawals.        Restless legs syndrome  -Continue ropinirole and gabapentin       Elevated creatinine, low potassium  -Repeat BMP is normal    Special precautions: Special Precautions Level 3 (q15 min checks) .    Behavioral Health Treatment Plan and Problem List: I have reviewed and approved the Behavioral Health Treatment Plan and Problem list.  The patient has had a chance to review and agrees with the treatment plan.    Copied text in portions of this note has been reviewed and is accurate as of 11/20/23         Clinician:  Douglas Sepulveda MD  11/20/23  10:05 EST

## 2023-11-21 PROCEDURE — 99232 SBSQ HOSP IP/OBS MODERATE 35: CPT | Performed by: PSYCHIATRY & NEUROLOGY

## 2023-11-21 RX ADMIN — ROPINIROLE HYDROCHLORIDE 2 MG: 1 TABLET, FILM COATED ORAL at 20:16

## 2023-11-21 RX ADMIN — NICOTINE TRANSDERMAL SYSTEM 1 PATCH: 21 PATCH, EXTENDED RELEASE TRANSDERMAL at 08:07

## 2023-11-21 RX ADMIN — IBUPROFEN 400 MG: 400 TABLET, FILM COATED ORAL at 15:29

## 2023-11-21 RX ADMIN — ACETAMINOPHEN 650 MG: 325 TABLET ORAL at 12:32

## 2023-11-21 RX ADMIN — ACETAMINOPHEN 650 MG: 325 TABLET ORAL at 20:16

## 2023-11-21 RX ADMIN — CETIRIZINE HYDROCHLORIDE 10 MG: 10 TABLET, FILM COATED ORAL at 08:07

## 2023-11-21 RX ADMIN — TRAZODONE HYDROCHLORIDE 50 MG: 50 TABLET ORAL at 20:16

## 2023-11-21 RX ADMIN — IBUPROFEN 400 MG: 400 TABLET, FILM COATED ORAL at 08:55

## 2023-11-21 RX ADMIN — GABAPENTIN 600 MG: 300 CAPSULE ORAL at 08:07

## 2023-11-21 RX ADMIN — LISINOPRIL 10 MG: 10 TABLET ORAL at 08:07

## 2023-11-21 NOTE — PLAN OF CARE
Goal Outcome Evaluation:  Plan of Care Reviewed With: patient  Patient Agreement with Plan of Care: agrees     Progress: improving  Outcome Evaluation: pt sleeping. calm and cooperative throughout assessment. pt says meds are helping, a/o x 4, sleep and appetite are good. denies SI/HI/AVH. rates anxiety and depression 4.

## 2023-11-21 NOTE — PROGRESS NOTES
"INPATIENT PSYCHIATRIC PROGRESS NOTE    Name:  Natanael Cavazos  :  1990  MRN:  [id number]  Visit Number:  [id number]  Length of stay:  6    SUBJECTIVE    CC/Focus of Exam: psychosis, depression    INTERVAL HISTORY:  The patient reports he has been talking to the sister who is willing to help him. She is the same sister he was worried about when he came in and realizes that his worries were unfounded. He denies feeling hopeless or suicidal. He states his sister will let him stay with her for a couple of months to help him get back on his feet and she is accepting of his life choices. He is agreeable to a discharge tomorrow.   Depression rating 10  Anxiety rating 1/10  Sleep:  good  Withdrawal sx: None reported  Cravin/10    Review of Systems   Respiratory: Negative.     Cardiovascular: Negative.    Gastrointestinal: Negative.        OBJECTIVE    Temp:  [97.9 °F (36.6 °C)-98.1 °F (36.7 °C)] 97.9 °F (36.6 °C)  Heart Rate:  [] 89  Resp:  [18] 18  BP: (111-131)/(69-81) 111/69    MENTAL STATUS EXAM:  Appearance:Casually dressed, good hygeine.   Cooperation:Cooperative  Psychomotor: No psychomotor agitation/retardation, No EPS, No motor tics  Speech-normal rate, amount.  Mood \"depressed\"   Affect-congruent, appropriate, stable  Thought Content-goal directed, not delusional now  Thought process-linear, organized.  Suicidality: No SI  Homicidality: No HI  Perception: No AH/VH  Insight-fair   Judgement-fair    Lab Results (last 24 hours)       ** No results found for the last 24 hours. **               Imaging Results (Last 24 Hours)       ** No results found for the last 24 hours. **               ECG/EMG Results (most recent)       Procedure Component Value Units Date/Time    ECG 12 Lead Other; Baseline Cardiac Status [[id number]] Collected: 11/15/23 0447     Updated: 23     QT Interval 386 ms      QTC Interval 442 ms     Narrative:      Test Reason : Other~  Blood Pressure :   */*   mmHG  Vent. " Rate :  79 BPM     Atrial Rate :  79 BPM     P-R Int : 138 ms          QRS Dur : 100 ms      QT Int : 386 ms       P-R-T Axes :  51  67  35 degrees     QTc Int : 442 ms    Normal sinus rhythm with sinus arrhythmia  Minimal voltage criteria for LVH, may be normal variant  Borderline ECG  No previous ECGs available  Confirmed by Erika Sargent ([id number]) on 11/16/2023 9:20:28 AM    Referred By:            Confirmed By: Erika Sargent             ALLERGIES: Bee venom and Strawberry    Medication Review:   Scheduled Medications:  cetirizine, 10 mg, Oral, Daily  gabapentin, 600 mg, Oral, Daily  lisinopril, 10 mg, Oral, Daily  nicotine, 1 patch, Transdermal, Q24H  OLANZapine, 5 mg, Oral, Nightly  rOPINIRole, 2 mg, Oral, Nightly         PRN Medications:    acetaminophen    albuterol sulfate HFA    aluminum-magnesium hydroxide-simethicone    benzonatate    benztropine **OR** benztropine    dicyclomine    famotidine    hydrOXYzine    ibuprofen    loperamide    magnesium hydroxide    ondansetron    sodium chloride    traZODone   All medications reviewed.    ASSESSMENT & PLAN:      Suicidal ideation  -SP 3       Psychosis unspecified  -Appears to be due to substance use (methamphetamine).   -Will treat symptomatically  -Discontinue olanzapine 5 mg nightly, started on 11/15/23 as patient's psychosis has cleared up and it appeared to have been caused by his recent methamphetamine use.        Polysubstance use disorder  -Patient admits to using alcohol, thc and meth but is vague about extent of his use. Will monitor for alcohol withdrawals.        Restless legs syndrome  -Continue ropinirole and gabapentin       Elevated creatinine, low potassium  -Repeat BMP is normal    Special precautions: Special Precautions Level 3 (q15 min checks) .    Behavioral Health Treatment Plan and Problem List: I have reviewed and approved the Behavioral Health Treatment Plan and Problem list.  The patient has had a chance to review and agrees with  the treatment plan.    Copied text in portions of this note has been reviewed and is accurate as of 11/21/23         Clinician:  Douglas Sepulveda MD  11/21/23  14:57 EST

## 2023-11-21 NOTE — PLAN OF CARE
Goal Outcome Evaluation:  Plan of Care Reviewed With: patient  Patient Agreement with Plan of Care: agrees     Progress: improving

## 2023-11-21 NOTE — PLAN OF CARE
Problem: Adult Behavioral Health Plan of Care  Goal: Patient-Specific Goal (Individualization)  Outcome: Ongoing, Progressing  Flowsheets  Taken 11/15/2023 1523  Patient-Specific Goals (Include Timeframe): Natanael to deny suicidal ideation prior to discharge, identify 1-2 healthy coping skills during patients 3-7 day hospital stay, engage in safe disposition planning prior to discharge.  Individualized Care Needs: Medication management, individual and group therapy.  Anxieties, Fears or Concerns: none reported  Taken 11/15/2023 1517  Patient Personal Strengths: resourceful  Patient Vulnerabilities: substance abuse/addiction  Goal: Optimized Coping Skills in Response to Life Stressors  Outcome: Ongoing, Progressing  Flowsheets (Taken 11/21/2023 1640)  Optimized Coping Skills in Response to Life Stressors: making progress toward outcome  Intervention: Promote Effective Coping Strategies  Flowsheets (Taken 11/21/2023 1640)  Supportive Measures:   active listening utilized   positive reinforcement provided   self-responsibility promoted   counseling provided   problem-solving facilitated   verbalization of feelings encouraged   decision-making supported   goal-setting facilitated   self-care encouraged   self-reflection promoted  Goal: Develops/Participates in Therapeutic [place] to Support Successful Transition  Outcome: Ongoing, Progressing  Flowsheets (Taken 11/21/2023 1640)  Develops/Participates in Therapeutic [place] to Support Successful Transition: making progress toward outcome  Intervention: Foster Therapeutic [place]  Flowsheets (Taken 11/21/2023 1640)  Trust Relationship/Rapport:   care explained   reassurance provided   choices provided   thoughts/feelings acknowledged   emotional support provided   empathic listening provided   questions answered   questions encouraged  Intervention: Mutually Develop Transition Plan  Flowsheets  Taken 11/21/2023 1640  Transition Support:   community resources reviewed    crisis management plan promoted   crisis management plan verbalized   follow-up care coordinated   follow-up care discussed  Taken 11/21/2023 3076  Transportation Anticipated:   health plan transportation   public transportation  Transportation Concerns: no car  Current Discharge Risk: psychiatric illness  Concerns to be Addressed:   coping/stress   cognitive/perceptual  Readmission Within the Last 30 Days: no previous admission in last 30 days  Patient/Family Anticipated Services at Transition:   mental health services   outpatient care  Patient/Family Anticipates Transition to: home with family  Offered/Gave Vendor List: no   Goal Outcome Evaluation:  Plan of Care Reviewed With: patient  Patient Agreement with Plan of Care: refuses to participate  Consent Given to Review Plan with: no consent today  Progress: no change  Outcome Evaluation: Attempted to review plan of care and initiated adult social history.    Data:  Therapist discussed patient during staffing with Dr. Sepulveda, discussed patient with nursing staff and met with patient this date to further discuss patient progress, review healthy coping and safe disposition.      Clinical Maneuvering/Intervention:    Therapist assisted patient in processing above session content; acknowledged and normalized patient's thoughts, feelings and concerns.  Encouraged patient to discuss/vent feelings, frustrations, and fears concerning their ongoing issues and validated patients feelings.  Discussed the importance of healthy coping and reviewed healthy coping skills such as distraction, thought reframing/redirecting, grounding, mindfulness, etc.  Reviewed safe disposition with patient.    Assessment:  Patient denies ongoing suicidal ideation/homicidal ideation.  Patient reports decrease in depression and anxiety today.  Patient states his sister has agreed to let him come stay with her and help him to get a job.  He reports that she is going to help him get back on his feet  and if he still wants to go to [place] help him with that as well.  Reviewed healthy coping and safety.      Plan:  Patient will continue hospitalization/medication management. Patient will return home with his sister upon stabilization.  Patient will engage in aftercare with [facility].

## 2023-11-22 VITALS
HEART RATE: 71 BPM | WEIGHT: 197.4 LBS | HEIGHT: 75 IN | TEMPERATURE: 97.3 F | RESPIRATION RATE: 18 BRPM | SYSTOLIC BLOOD PRESSURE: 112 MMHG | BODY MASS INDEX: 24.54 KG/M2 | DIASTOLIC BLOOD PRESSURE: 66 MMHG | OXYGEN SATURATION: 97 %

## 2023-11-22 PROBLEM — F19.959 PSYCHOACTIVE SUBSTANCE-INDUCED PSYCHOSIS: Status: ACTIVE | Noted: 2023-11-15

## 2023-11-22 PROCEDURE — 99238 HOSP IP/OBS DSCHRG MGMT 30/<: CPT | Performed by: PSYCHIATRY & NEUROLOGY

## 2023-11-22 RX ADMIN — ACETAMINOPHEN 650 MG: 325 TABLET ORAL at 11:56

## 2023-11-22 RX ADMIN — GABAPENTIN 600 MG: 300 CAPSULE ORAL at 08:02

## 2023-11-22 RX ADMIN — LISINOPRIL 10 MG: 10 TABLET ORAL at 08:02

## 2023-11-22 RX ADMIN — IBUPROFEN 400 MG: 400 TABLET, FILM COATED ORAL at 08:02

## 2023-11-22 RX ADMIN — CETIRIZINE HYDROCHLORIDE 10 MG: 10 TABLET, FILM COATED ORAL at 08:02

## 2023-11-22 NOTE — DISCHARGE SUMMARY
":  1990  MRN:  [id number]  Visit Number:  [id number]      Date of Admission:11/15/2023   Date of Discharge:  2023    Discharge Diagnosis:  Principal Problem:    Suicidal ideation  Active Problems:    Psychoactive substance-induced psychosis    Polysubstance use disorder        Admission Diagnosis:  Suicidal ideation [R45.851]     RICKI Cavazos is a 33 y.o. male who was admitted on 11/15/2023 with complaints of not feeling good. The patient was brought in by the EMS with worsening suicidal ideatoins and a plan to overdose, freeze or choke self.   For details please see H&P dated 11/15/23.     Hospital Course  Patient is a 33 y.o. male presented with psychosis and suicidal ideations which appeared to be caused by his recent methamphetamine use. The patient was admitted to the adult psych unit for safety, further evaluation and treatment. He was treated symptomatically with olanzapine but once his psychosis cleared up olanzapine was discontinued. The patient did well after stopping the medication and was no longer verbalizing delusional thoughts about the safety and wellbeing of his sister and was able to talk to her. He admitted that his drug use was causing him a lot of problem and reported his plan was to move away from the people who were using and finding a job and outpatient treatment.      Mental Status Exam upon discharge:   Mood \"good\"   Affect-congruent, appropriate, stable  Thought Content-goal directed, no delusional material present  Thought process-linear, organized.  Suicidality: No SI  Homicidality: No HI  Perception: No AH/VH    Procedures Performed         Consults:   Consults       No orders found from 10/17/2023 to 2023.            Pertinent Test Results:   Admission on 11/15/2023   Component Date Value Ref Range Status    Hepatitis B Surface Ag 11/15/2023 Non-Reactive  Non-Reactive Final    Hep A IgM 11/15/2023 Non-Reactive  Non-Reactive Final    Hep B C IgM 11/15/2023 " Non-Reactive  Non-Reactive Final    Hepatitis C Ab 11/15/2023 Reactive (A)  Non-Reactive Final    QT Interval 11/15/2023 386  ms Final    QTC Interval 11/15/2023 442  ms Final    Glucose 11/16/2023 85  65 - 99 mg/dL Final    BUN 11/16/2023 19  6 - 20 mg/dL Final    Creatinine 11/16/2023 1.07  0.76 - 1.27 mg/dL Final    Sodium 11/16/2023 140  136 - 145 mmol/L Final    Potassium 11/16/2023 4.1  3.5 - 5.2 mmol/L Final    Slight hemolysis detected by analyzer. Result may be falsely elevated.    Chloride 11/16/2023 104  98 - 107 mmol/L Final    CO2 11/16/2023 25.3  22.0 - 29.0 mmol/L Final    Calcium 11/16/2023 10.0  8.6 - 10.5 mg/dL Final    BUN/Creatinine Ratio 11/16/2023 17.8  7.0 - 25.0 Final    Anion Gap 11/16/2023 10.7  5.0 - 15.0 mmol/L Final    eGFR 11/16/2023 94.6  >60.0 mL/min/1.73 Final   Admission on 11/14/2023, Discharged on 11/15/2023   Component Date Value Ref Range Status    Glucose 11/14/2023 134 (H)  65 - 99 mg/dL Final    BUN 11/14/2023 21 (H)  6 - 20 mg/dL Final    Creatinine 11/14/2023 1.29 (H)  0.76 - 1.27 mg/dL Final    Sodium 11/14/2023 139  136 - 145 mmol/L Final    Potassium 11/14/2023 3.4 (L)  3.5 - 5.2 mmol/L Final    Chloride 11/14/2023 105  98 - 107 mmol/L Final    CO2 11/14/2023 19.4 (L)  22.0 - 29.0 mmol/L Final    Calcium 11/14/2023 10.6 (H)  8.6 - 10.5 mg/dL Final    Total Protein 11/14/2023 8.4  6.0 - 8.5 g/dL Final    Albumin 11/14/2023 4.9  3.5 - 5.2 g/dL Final    ALT (SGPT) 11/14/2023 26  1 - 41 U/L Final    AST (SGOT) 11/14/2023 17  1 - 40 U/L Final    Alkaline Phosphatase 11/14/2023 121 (H)  39 - 117 U/L Final    Total Bilirubin 11/14/2023 0.7  0.0 - 1.2 mg/dL Final    Globulin 11/14/2023 3.5  gm/dL Final    A/G Ratio 11/14/2023 1.4  g/dL Final    BUN/Creatinine Ratio 11/14/2023 16.3  7.0 - 25.0 Final    Anion Gap 11/14/2023 14.6  5.0 - 15.0 mmol/L Final    eGFR 11/14/2023 75.6  >60.0 mL/min/1.73 Final    Color, UA 11/14/2023 Dark Yellow (A)  Yellow, Straw Final    Appearance, UA  11/14/2023 Cloudy (A)  Clear Final    pH, UA 11/14/2023 6.5  5.0 - 8.0 Final    Specific Gravity, UA 11/14/2023 >1.030 (H)  1.005 - 1.030 Final    Glucose, UA 11/14/2023 Negative  Negative Final    Ketones, UA 11/14/2023 80 mg/dL (3+) (A)  Negative Final    Bilirubin, UA 11/14/2023 Small (1+) (A)  Negative Final    Blood, UA 11/14/2023 Negative  Negative Final    Protein, UA 11/14/2023 30 mg/dL (1+) (A)  Negative Final    Leuk Esterase, UA 11/14/2023 Negative  Negative Final    Nitrite, UA 11/14/2023 Negative  Negative Final    Urobilinogen, UA 11/14/2023 1.0 E.U./dL  0.2 - 1.0 E.U./dL Final    THC, Screen, Urine 11/14/2023 Positive (A)  Negative Final    Phencyclidine (PCP), Urine 11/14/2023 Negative  Negative Final    Cocaine Screen, Urine 11/14/2023 Negative  Negative Final    Methamphetamine, Ur 11/14/2023 Positive (A)  Negative Final    Opiate Screen 11/14/2023 Negative  Negative Final    Amphetamine Screen, Urine 11/14/2023 Positive (A)  Negative Final    Benzodiazepine Screen, Urine 11/14/2023 Negative  Negative Final    Tricyclic Antidepressants Screen 11/14/2023 Negative  Negative Final    Methadone Screen, Urine 11/14/2023 Negative  Negative Final    Barbiturates Screen, Urine 11/14/2023 Negative  Negative Final    Oxycodone Screen, Urine 11/14/2023 Negative  Negative Final    Buprenorphine, Screen, Urine 11/14/2023 Negative  Negative Final    Magnesium 11/14/2023 1.9  1.6 - 2.6 mg/dL Final    Ethanol 11/14/2023 <10  0 - 10 mg/dL Final    Ethanol % 11/14/2023 <0.010  % Final    WBC 11/14/2023 12.63 (H)  3.40 - 10.80 10*3/mm3 Final    RBC 11/14/2023 5.66  4.14 - 5.80 10*6/mm3 Final    Hemoglobin 11/14/2023 16.5  13.0 - 17.7 g/dL Final    Hematocrit 11/14/2023 49.3  37.5 - 51.0 % Final    MCV 11/14/2023 87.1  79.0 - 97.0 fL Final    MCH 11/14/2023 29.2  26.6 - 33.0 pg Final    MCHC 11/14/2023 33.5  31.5 - 35.7 g/dL Final    RDW 11/14/2023 13.8  12.3 - 15.4 % Final    RDW-SD 11/14/2023 44.4  37.0 - 54.0 fl  Final    MPV 11/14/2023 11.2  6.0 - 12.0 fL Final    Platelets 11/14/2023 323  140 - 450 10*3/mm3 Final    Neutrophil % 11/14/2023 75.9  42.7 - 76.0 % Final    Lymphocyte % 11/14/2023 16.4 (L)  19.6 - 45.3 % Final    Monocyte % 11/14/2023 6.9  5.0 - 12.0 % Final    Eosinophil % 11/14/2023 0.1 (L)  0.3 - 6.2 % Final    Basophil % 11/14/2023 0.4  0.0 - 1.5 % Final    Immature Grans % 11/14/2023 0.3  0.0 - 0.5 % Final    Neutrophils, Absolute 11/14/2023 9.59 (H)  1.70 - 7.00 10*3/mm3 Final    Lymphocytes, Absolute 11/14/2023 2.07  0.70 - 3.10 10*3/mm3 Final    Monocytes, Absolute 11/14/2023 0.87  0.10 - 0.90 10*3/mm3 Final    Eosinophils, Absolute 11/14/2023 0.01  0.00 - 0.40 10*3/mm3 Final    Basophils, Absolute 11/14/2023 0.05  0.00 - 0.20 10*3/mm3 Final    Immature Grans, Absolute 11/14/2023 0.04  0.00 - 0.05 10*3/mm3 Final    nRBC 11/14/2023 0.0  0.0 - 0.2 /100 WBC Final    Fentanyl, Urine 11/14/2023 Negative  Negative Final    RBC, UA 11/14/2023 0-2  None Seen, 0-2 /HPF Final    WBC, UA 11/14/2023 0-2  None Seen, 0-2 /HPF Final    Bacteria, UA 11/14/2023 Trace (A)  None Seen /HPF Final    Squamous Epithelial Cells, UA 11/14/2023 3-6 (A)  None Seen, 0-2 /HPF Final    Hyaline Casts, UA 11/14/2023 None Seen  None Seen /LPF Final    Methodology 11/14/2023 Manual Light Microscopy   Final        Condition on Discharge:  improved    Vital Signs  Temp:  [97.3 °F (36.3 °C)-97.8 °F (36.6 °C)] 97.3 °F (36.3 °C)  Heart Rate:  [] 71  Resp:  [18] 18  BP: (112-128)/(66-83) 112/66      Discharge Disposition:  Home or Self Care    Discharge Medications:     Discharge Medications        Continue These Medications        Instructions Start Date   albuterol sulfate  (90 Base) MCG/ACT inhaler  Commonly known as: PROVENTIL HFA;VENTOLIN HFA;PROAIR HFA   2 puffs, Inhalation, Every 4 Hours PRN      cetirizine 10 MG tablet  Commonly known as: zyrTEC   10 mg, Oral, Daily      dicyclomine 10 MG capsule  Commonly known as:  BENTYL   10 mg, Oral, Daily PRN      gabapentin 600 MG tablet  Commonly known as: NEURONTIN   600 mg, Oral, Every Night at Bedtime      ibuprofen 800 MG tablet  Commonly known as: ADVIL,MOTRIN   800 mg, Oral, 2 Times Daily PRN      lisinopril 10 MG tablet  Commonly known as: PRINIVIL,ZESTRIL   10 mg, Oral, Daily      rOPINIRole 2 MG tablet  Commonly known as: REQUIP   2 mg, Oral, Every Night at Bedtime             Stop These Medications      amoxicillin 875 MG tablet  Commonly known as: AMOXIL              Discharge Diet: Regular     Activity at Discharge: As tolerate     Follow-up Appointments  [address] [address]     [phone]     November 28th, 2023 at 2:15 p.m. with Kaycee Jay PA-C       Time: I spent  20  minutes on this discharge activity which included: face-to-face encounter with the patient, reviewing the data in the system, coordination of the care with the nursing staff as well as consultants, documentation, and entering orders.        Clinician:   Douglas Sepulveda MD  11/22/23  08:53 EST

## 2023-11-22 NOTE — PLAN OF CARE
Goal Outcome Evaluation:  Plan of Care Reviewed With: patient  Patient Agreement with Plan of Care: agrees        Outcome Evaluation: Pt reports good sleep and appetite. Denies anxiety, depression, SI/HI and AVH. Pt calm and cooperative.

## 2023-11-22 NOTE — PLAN OF CARE
Goal Outcome Evaluation:  Plan of Care Reviewed With: patient  Patient Agreement with Plan of Care: agrees     Progress: improving  Outcome Evaluation: Patient discharged, leaving the unit with belongings. Patient denies suicidal or homicidal ideation

## 2023-11-22 NOTE — PROGRESS NOTES
Discharge Note:     Therapist introduced myself as covering for patient's primary therapist, Ana Lilia. Patient is agreeable to meet with me.     On date of discharge, patient is calm and cooperative. Patient denies SI/HI/AVH. Patient reports improvement in mood and symptoms during hospitalization. Patient is future oriented, looking forward to going to his sister's house. Patient is able to identify protective factors and healthy coping skills. Patient is agreeable to return to the nearest ED/contact 911 if they experience SI/HI/AVH. Patient denies needs or concerns prior to discharge today.     Therapist obtained consent for patient's sister, Kaiser Cavazos. Therapist contacted Kaiser at [phone]. She confirms the patient will come to her home today. Therapist advised Kaiser to secure firearms/weapons, medications, and knives/sharp objects. She is agreeable and confirms there are no firearms in the home. She denies any safety concerns regarding discharge. Therapist advised Kaiser to contact staff at any time with questions or concerns.

## 2023-11-22 NOTE — PAYOR COMM NOTE
"Rosalia Cavazos (33 y.o. Male)       Date of Birth   1990    Social Security Number       Address   [address] [place] [address]    Home Phone   [phone]    MRN   [id number]       Latter-day   None    Marital Status   Single                            Admission Date   11/15/23    Admission Type   Emergency    Admitting Provider   oDuglas Sepulveda MD    Attending Provider       Department, Room/Bed   [facility], 1019/2S       Discharge Date   11/22/2023    Discharge Disposition   Home or Self Care    Discharge Destination                                 Attending Provider: (none)   Allergies: Bee Venom, Strawberry    Isolation: None   Infection: None   Code Status: CPR    Ht: 190.5 cm (75\")   Wt: 89.5 kg (197 lb 6.4 oz)    Admission Cmt: None   Principal Problem: Suicidal ideation [R45.851]                   Active Insurance as of 11/15/2023       Primary Coverage       Payor Plan Insurance Group Employer/Plan Group    ANTHEM MEDICAID AN[employer]M MEDICAID [id number]       Payor Plan Address Payor Plan Phone Number Payor Plan Fax Number Effective Dates    [address] [address] [phone]  9/1/2020 - None Entered    [facility] [phone]         Subscriber Name Subscriber Birth Date Member ID       ROSALIA CAVAZOS 1990 [id number]                     Emergency Contacts            No emergency contacts on file.          PLEASE ATTACH THIS DISCHARGE INFORMATION TO AUTH. # [id number]     DISCHARGE DATE:  11/22/2023    Discharge Diagnosis:  Principal Problem:    Suicidal ideation  Active Problems:    Psychoactive substance-induced psychosis (F19.595)    Polysubstance use disorder (F19.90)   Suicidal ideation ICD-10-CM: R45.851  ICD-9-CM: V62.84   11/15/2023 - Present   Psychoactive substance-induced psychosis ICD-10-CM: F19.959  ICD-9-CM: 292.9, 305.90   11/15/2023 - Present   Polysubstance use disorder ICD-10-CM: F19.90  ICD-9-CM: 305.90   11/15/2023 - Present " "      FOLLOW UP:  [address] [address]     [phone]     2023 at 2:15 p.m. with Kaycee Jay PA-C   Please bring your photo ID and your insurance card.         Discharge Summary        Douglas Sepulveda MD at 23 0848          :  1990  MRN:  [id number]  Visit Number:  [id number]      Date of Admission:11/15/2023   Date of Discharge:  2023    Discharge Diagnosis:  Principal Problem:    Suicidal ideation  Active Problems:    Psychoactive substance-induced psychosis    Polysubstance use disorder        Admission Diagnosis:  Suicidal ideation [R45.851]     RICKI Cavazos is a 33 y.o. male who was admitted on 11/15/2023 with complaints of not feeling good. The patient was brought in by the EMS with worsening suicidal ideatoins and a plan to overdose, freeze or choke self.   For details please see H&P dated 11/15/23.     Hospital Course  Patient is a 33 y.o. male presented with psychosis and suicidal ideations which appeared to be caused by his recent methamphetamine use. The patient was admitted to the adult psych unit for safety, further evaluation and treatment. He was treated symptomatically with olanzapine but once his psychosis cleared up olanzapine was discontinued. The patient did well after stopping the medication and was no longer verbalizing delusional thoughts about the safety and wellbeing of his sister and was able to talk to her. He admitted that his drug use was causing him a lot of problem and reported his plan was to move away from the people who were using and finding a job and outpatient treatment.      Mental Status Exam upon discharge:   Mood \"good\"   Affect-congruent, appropriate, stable  Thought Content-goal directed, no delusional material present  Thought process-linear, organized.  Suicidality: No SI  Homicidality: No HI  Perception: No AH/VH    Procedures Performed         Consults:   Consults       No orders found " from 10/17/2023 to 11/16/2023.            Pertinent Test Results:   Admission on 11/15/2023   Component Date Value Ref Range Status    Hepatitis B Surface Ag 11/15/2023 Non-Reactive  Non-Reactive Final    Hep A IgM 11/15/2023 Non-Reactive  Non-Reactive Final    Hep B C IgM 11/15/2023 Non-Reactive  Non-Reactive Final    Hepatitis C Ab 11/15/2023 Reactive (A)  Non-Reactive Final    QT Interval 11/15/2023 386  ms Final    QTC Interval 11/15/2023 442  ms Final    Glucose 11/16/2023 85  65 - 99 mg/dL Final    BUN 11/16/2023 19  6 - 20 mg/dL Final    Creatinine 11/16/2023 1.07  0.76 - 1.27 mg/dL Final    Sodium 11/16/2023 140  136 - 145 mmol/L Final    Potassium 11/16/2023 4.1  3.5 - 5.2 mmol/L Final    Slight hemolysis detected by analyzer. Result may be falsely elevated.    Chloride 11/16/2023 104  98 - 107 mmol/L Final    CO2 11/16/2023 25.3  22.0 - 29.0 mmol/L Final    Calcium 11/16/2023 10.0  8.6 - 10.5 mg/dL Final    BUN/Creatinine Ratio 11/16/2023 17.8  7.0 - 25.0 Final    Anion Gap 11/16/2023 10.7  5.0 - 15.0 mmol/L Final    eGFR 11/16/2023 94.6  >60.0 mL/min/1.73 Final   Admission on 11/14/2023, Discharged on 11/15/2023   Component Date Value Ref Range Status    Glucose 11/14/2023 134 (H)  65 - 99 mg/dL Final    BUN 11/14/2023 21 (H)  6 - 20 mg/dL Final    Creatinine 11/14/2023 1.29 (H)  0.76 - 1.27 mg/dL Final    Sodium 11/14/2023 139  136 - 145 mmol/L Final    Potassium 11/14/2023 3.4 (L)  3.5 - 5.2 mmol/L Final    Chloride 11/14/2023 105  98 - 107 mmol/L Final    CO2 11/14/2023 19.4 (L)  22.0 - 29.0 mmol/L Final    Calcium 11/14/2023 10.6 (H)  8.6 - 10.5 mg/dL Final    Total Protein 11/14/2023 8.4  6.0 - 8.5 g/dL Final    Albumin 11/14/2023 4.9  3.5 - 5.2 g/dL Final    ALT (SGPT) 11/14/2023 26  1 - 41 U/L Final    AST (SGOT) 11/14/2023 17  1 - 40 U/L Final    Alkaline Phosphatase 11/14/2023 121 (H)  39 - 117 U/L Final    Total Bilirubin 11/14/2023 0.7  0.0 - 1.2 mg/dL Final    Globulin 11/14/2023 3.5  gm/dL  Final    A/G Ratio 11/14/2023 1.4  g/dL Final    BUN/Creatinine Ratio 11/14/2023 16.3  7.0 - 25.0 Final    Anion Gap 11/14/2023 14.6  5.0 - 15.0 mmol/L Final    eGFR 11/14/2023 75.6  >60.0 mL/min/1.73 Final    Color, UA 11/14/2023 Dark Yellow (A)  Yellow, Straw Final    Appearance, UA 11/14/2023 Cloudy (A)  Clear Final    pH, UA 11/14/2023 6.5  5.0 - 8.0 Final    Specific Gravity, UA 11/14/2023 >1.030 (H)  1.005 - 1.030 Final    Glucose, UA 11/14/2023 Negative  Negative Final    Ketones, UA 11/14/2023 80 mg/dL (3+) (A)  Negative Final    Bilirubin, UA 11/14/2023 Small (1+) (A)  Negative Final    Blood, UA 11/14/2023 Negative  Negative Final    Protein, UA 11/14/2023 30 mg/dL (1+) (A)  Negative Final    Leuk Esterase, UA 11/14/2023 Negative  Negative Final    Nitrite, UA 11/14/2023 Negative  Negative Final    Urobilinogen, UA 11/14/2023 1.0 E.U./dL  0.2 - 1.0 E.U./dL Final    THC, Screen, Urine 11/14/2023 Positive (A)  Negative Final    Phencyclidine (PCP), Urine 11/14/2023 Negative  Negative Final    Cocaine Screen, Urine 11/14/2023 Negative  Negative Final    Methamphetamine, Ur 11/14/2023 Positive (A)  Negative Final    Opiate Screen 11/14/2023 Negative  Negative Final    Amphetamine Screen, Urine 11/14/2023 Positive (A)  Negative Final    Benzodiazepine Screen, Urine 11/14/2023 Negative  Negative Final    Tricyclic Antidepressants Screen 11/14/2023 Negative  Negative Final    Methadone Screen, Urine 11/14/2023 Negative  Negative Final    Barbiturates Screen, Urine 11/14/2023 Negative  Negative Final    Oxycodone Screen, Urine 11/14/2023 Negative  Negative Final    Buprenorphine, Screen, Urine 11/14/2023 Negative  Negative Final    Magnesium 11/14/2023 1.9  1.6 - 2.6 mg/dL Final    Ethanol 11/14/2023 <10  0 - 10 mg/dL Final    Ethanol % 11/14/2023 <0.010  % Final    WBC 11/14/2023 12.63 (H)  3.40 - 10.80 10*3/mm3 Final    RBC 11/14/2023 5.66  4.14 - 5.80 10*6/mm3 Final    Hemoglobin 11/14/2023 16.5  13.0 - 17.7  g/dL Final    Hematocrit 11/14/2023 49.3  37.5 - 51.0 % Final    MCV 11/14/2023 87.1  79.0 - 97.0 fL Final    MCH 11/14/2023 29.2  26.6 - 33.0 pg Final    MCHC 11/14/2023 33.5  31.5 - 35.7 g/dL Final    RDW 11/14/2023 13.8  12.3 - 15.4 % Final    RDW-SD 11/14/2023 44.4  37.0 - 54.0 fl Final    MPV 11/14/2023 11.2  6.0 - 12.0 fL Final    Platelets 11/14/2023 323  140 - 450 10*3/mm3 Final    Neutrophil % 11/14/2023 75.9  42.7 - 76.0 % Final    Lymphocyte % 11/14/2023 16.4 (L)  19.6 - 45.3 % Final    Monocyte % 11/14/2023 6.9  5.0 - 12.0 % Final    Eosinophil % 11/14/2023 0.1 (L)  0.3 - 6.2 % Final    Basophil % 11/14/2023 0.4  0.0 - 1.5 % Final    Immature Grans % 11/14/2023 0.3  0.0 - 0.5 % Final    Neutrophils, Absolute 11/14/2023 9.59 (H)  1.70 - 7.00 10*3/mm3 Final    Lymphocytes, Absolute 11/14/2023 2.07  0.70 - 3.10 10*3/mm3 Final    Monocytes, Absolute 11/14/2023 0.87  0.10 - 0.90 10*3/mm3 Final    Eosinophils, Absolute 11/14/2023 0.01  0.00 - 0.40 10*3/mm3 Final    Basophils, Absolute 11/14/2023 0.05  0.00 - 0.20 10*3/mm3 Final    Immature Grans, Absolute 11/14/2023 0.04  0.00 - 0.05 10*3/mm3 Final    nRBC 11/14/2023 0.0  0.0 - 0.2 /100 WBC Final    Fentanyl, Urine 11/14/2023 Negative  Negative Final    RBC, UA 11/14/2023 0-2  None Seen, 0-2 /HPF Final    WBC, UA 11/14/2023 0-2  None Seen, 0-2 /HPF Final    Bacteria, UA 11/14/2023 Trace (A)  None Seen /HPF Final    Squamous Epithelial Cells, UA 11/14/2023 3-6 (A)  None Seen, 0-2 /HPF Final    Hyaline Casts, UA 11/14/2023 None Seen  None Seen /LPF Final    Methodology 11/14/2023 Manual Light Microscopy   Final        Condition on Discharge:  improved    Vital Signs  Temp:  [97.3 °F (36.3 °C)-97.8 °F (36.6 °C)] 97.3 °F (36.3 °C)  Heart Rate:  [] 71  Resp:  [18] 18  BP: (112-128)/(66-83) 112/66      Discharge Disposition:  Home or Self Care    Discharge Medications:     Discharge Medications        Continue These Medications        Instructions Start Date    albuterol sulfate  (90 Base) MCG/ACT inhaler  Commonly known as: PROVENTIL HFA;VENTOLIN HFA;PROAIR HFA   2 puffs, Inhalation, Every 4 Hours PRN      cetirizine 10 MG tablet  Commonly known as: zyrTEC   10 mg, Oral, Daily      dicyclomine 10 MG capsule  Commonly known as: BENTYL   10 mg, Oral, Daily PRN      gabapentin 600 MG tablet  Commonly known as: NEURONTIN   600 mg, Oral, Every Night at Bedtime      ibuprofen 800 MG tablet  Commonly known as: ADVIL,MOTRIN   800 mg, Oral, 2 Times Daily PRN      lisinopril 10 MG tablet  Commonly known as: PRINIVIL,ZESTRIL   10 mg, Oral, Daily      rOPINIRole 2 MG tablet  Commonly known as: REQUIP   2 mg, Oral, Every Night at Bedtime             Stop These Medications      amoxicillin 875 MG tablet  Commonly known as: AMOXIL              Discharge Diet: Regular     Activity at Discharge: As tolerate     Follow-up Appointments  [address] [id number] [phone]     November 28th, 2023 at 2:15 p.m. with Kaycee Jay PA-C       Time: I spent  20  minutes on this discharge activity which included: face-to-face encounter with the patient, reviewing the data in the system, coordination of the care with the nursing staff as well as consultants, documentation, and entering orders.        Clinician:   Douglas Sepulveda MD  11/22/23  08:53 EST    Electronically signed by Douglas Sepulveda MD at 11/22/23 0854

## 2024-12-31 ENCOUNTER — TRANSCRIBE ORDERS (OUTPATIENT)
Dept: ADMINISTRATIVE | Facility: HOSPITAL | Age: 34
End: 2024-12-31
Payer: MEDICAID

## 2024-12-31 DIAGNOSIS — R60.0 PERIPHERAL EDEMA: Primary | ICD-10-CM

## 2024-12-31 DIAGNOSIS — L03.90 CELLULITIS, UNSPECIFIED CELLULITIS SITE: ICD-10-CM

## 2025-01-05 ENCOUNTER — TRANSCRIBE ORDERS (OUTPATIENT)
Dept: ADMINISTRATIVE | Facility: HOSPITAL | Age: 35
End: 2025-01-05
Payer: MEDICAID

## 2025-01-05 DIAGNOSIS — L03.90 CELLULITIS, UNSPECIFIED CELLULITIS SITE: ICD-10-CM

## 2025-01-05 DIAGNOSIS — R60.0 PERIPHERAL EDEMA: Primary | ICD-10-CM
